# Patient Record
Sex: MALE | Race: BLACK OR AFRICAN AMERICAN | NOT HISPANIC OR LATINO
[De-identification: names, ages, dates, MRNs, and addresses within clinical notes are randomized per-mention and may not be internally consistent; named-entity substitution may affect disease eponyms.]

---

## 2018-12-26 PROBLEM — Z00.00 ENCOUNTER FOR PREVENTIVE HEALTH EXAMINATION: Status: ACTIVE | Noted: 2018-12-26

## 2019-01-22 ENCOUNTER — RECORD ABSTRACTING (OUTPATIENT)
Age: 64
End: 2019-01-22

## 2019-01-22 DIAGNOSIS — Z78.9 OTHER SPECIFIED HEALTH STATUS: ICD-10-CM

## 2019-01-22 DIAGNOSIS — Z83.3 FAMILY HISTORY OF DIABETES MELLITUS: ICD-10-CM

## 2019-01-22 DIAGNOSIS — Z86.39 PERSONAL HISTORY OF OTHER ENDOCRINE, NUTRITIONAL AND METABOLIC DISEASE: ICD-10-CM

## 2019-01-22 RX ORDER — VALSARTAN 320 MG/1
320 TABLET ORAL
Refills: 0 | Status: ACTIVE | COMMUNITY

## 2019-01-22 RX ORDER — AMLODIPINE BESYLATE 5 MG/1
5 TABLET ORAL
Refills: 0 | Status: ACTIVE | COMMUNITY

## 2019-01-22 RX ORDER — CHROMIUM 200 MCG
400 TABLET ORAL
Refills: 0 | Status: ACTIVE | COMMUNITY

## 2019-01-22 RX ORDER — HYDROCORTISONE ACETATE 0.5 %
CREAM (GRAM) TOPICAL
Refills: 0 | Status: ACTIVE | COMMUNITY

## 2019-01-22 RX ORDER — CINNAMON BARK 500 MG
CAPSULE ORAL
Refills: 0 | Status: ACTIVE | COMMUNITY

## 2019-01-25 ENCOUNTER — APPOINTMENT (OUTPATIENT)
Dept: ENDOCRINOLOGY | Facility: CLINIC | Age: 64
End: 2019-01-25
Payer: COMMERCIAL

## 2019-01-25 VITALS
BODY MASS INDEX: 34.22 KG/M2 | DIASTOLIC BLOOD PRESSURE: 80 MMHG | HEART RATE: 84 BPM | SYSTOLIC BLOOD PRESSURE: 120 MMHG | HEIGHT: 70 IN | WEIGHT: 239 LBS

## 2019-01-25 DIAGNOSIS — Z87.828 PERSONAL HISTORY OF OTHER (HEALED) PHYSICAL INJURY AND TRAUMA: ICD-10-CM

## 2019-01-25 PROCEDURE — 99213 OFFICE O/P EST LOW 20 MIN: CPT | Mod: 25

## 2019-01-25 PROCEDURE — 36415 COLL VENOUS BLD VENIPUNCTURE: CPT

## 2019-02-10 LAB
ANION GAP SERPL CALC-SCNC: 11 MMOL/L
BUN SERPL-MCNC: 19 MG/DL
CALCIUM SERPL-MCNC: 9.8 MG/DL
CHLORIDE SERPL-SCNC: 100 MMOL/L
CO2 SERPL-SCNC: 27 MMOL/L
CREAT SERPL-MCNC: 1.45 MG/DL
FRUCTOSAMINE SERPL-MCNC: 257 UMOL/L
GLUCOSE SERPL-MCNC: 117 MG/DL
HBA1C MFR BLD HPLC: 7.1 %
PHOSPHATE SERPL-MCNC: 2.8 MG/DL
POTASSIUM SERPL-SCNC: 5 MMOL/L
SODIUM SERPL-SCNC: 138 MMOL/L

## 2019-04-07 NOTE — HISTORY OF PRESENT ILLNESS
[FreeTextEntry1] : 2019\par \par Brings in original Leonora Compton and given coupon for Leonora 14   \par Being followed at [facility] in [place] Fax [phone] - Dr. Jarrell Frye \par Recent [id number] 1.62 \par 25 OH vit D 37\par B12 344 \par uric acid 7.4\par Mg 1.6\par TSH 4.57 ***  (stable, abs neg)\par \par For diabetes, taking:\par \par    glipizide ER 2.5 mg in PM\par            pioglitazone 15 mg every PM\par            JanuMet  BID\par             Cialis 5 mg prn\par             Amlodipine\par \par He was given good report by his ophthalmologist. \par \par Plan:  . A1c today.  \par \par \par \par Previous notes from [employer] appended below.\par \par  2018\par            .\par            PCP: Dr. Abdifatah Cutler at [facility] [phone]\par             Eyes: Dr. Tobias Pichardo 2015, 2017 p [phone]\par             Card: Dr. Alvarez for EST\par             GI: Dr. Zimmerman - 2014 - +FHx colon cancer age 61\par             Ortho: Dr. Cisneros #2 for knee surgery\par             -> Dr. Howard Red p \par             Nephro: Dr. Lino Ahn - since  for elevated creatinine\par             phone: [phone] [place]\par             fax: [phone]\par             now Dr. Jarrell Frye \par             Feet: Dr. Carrillo in [facility] - Hx bunion surgery bilat\par             Chiropractor: Dr. Wilber Zambrano phone [phone]\par            .\par            CC: Type 2 diabetes () 2016 A1c 7.2 %\par             -"trace retinopathy" will see 2018\par             Possible allergy to insulin\par             Early hypothyroidism - TSH 6.46 2016 -> 2.89 (3/2016; \par             TPO ab neg) \par             (CRI: creatinine 1.48, urine microalb - neg)\par             2017 fall: R rotator cuff and R wrist injury\par            .\par            Reviewed Leonora.\par            Spikes after [place] [employer]. from [employer] in [place]\par            .\par            glipizide ER 2.5 mg in PM\par            Actos 15 mg every PM\par            JanuMet  BID\par             Cialis 5 mg prn\par             Amlodipine\par            .\par            Eyes checked by Dr. Collado in 2018 and again 2019 \par            .\par            ==\par            .\par            2018\par            .\par            PCP: Dr. Abdifatah Cutler at [facility] [phone]\par             Eyes: Dr. Tobias Pichardo 2015, 2017 p [phone]\par             Card: Dr. Alvarez for EST\par             GI: Dr. Zimmerman - 2014 - +FHx colon cancer age 61\par             Ortho: Dr. Cisneros #2 for knee surgery\par             -> Dr. Howard Red p \par             Nephro: Dr. Lino Ahn - since  for elevated creatinine\par             phone: [phone] [place]\par             fax: [phone]\par             now Dr. Jarrell Frye to see 2018\par             Feet: Dr. Carrillo in [facility] -  bunion surgery bilat\par             Chiropractor: Dr. Wilber Zambrano phone [phone]\par            .\par            CC: Type 2 diabetes () 2016 A1c 7.2 %\par             -"trace retinopathy" will see 2018\par             Possible allergy to insulin\par             Early hypothyroidism - TSH 6.46 2016 -> 2.89 (3/2016; \par             TPO ab neg) \par             (CRI: creatinine 1.48, urine microalb - neg)\par             2017 fall: R rotator cuff and R wrist injury\par            ..\par            Using [employer]\par            .\par            Meds:\par            .\par            glipizide ER 2.5 mg in PM\par            Actos 15 mg every PM\par            JanuMet  BID\par             Cialis 5 mg prn\par             Amlodipine\par            .\par            Instructed with wife regarding Leonora.\par            Discussed reactive hypoglycemia after Oatmeal. \par            ROV one month. \par            .\par            ==\par            .\par            2018\par            .\par            PCP: Dr. Abdifatah Cutler at [facility] [phone]\par             Eyes: Dr. Tobias Pichardo 2015, 2017 p [phone]\par             Card: Dr. Alvarez for EST\par             GI: Dr. Zimmerman - 2014 - +FHx colon cancer age 61\par             Ortho: Dr. Cisneros #2 for knee surgery\par             -> Dr. Howard Red p \par             Nephro: Dr. Lino Ahn - since  for elevated creatinine\par             phone: [phone] [place]\par             fax: [phone]\par             now Dr. Jarrell Frye \par             Feet: Dr. Carrillo in  [place] - Hx bunion surgery bilat\par             Chiropractor: Dr. Wilber Zambrano phone [phone]\par            .\par            CC: Type 2 diabetes () 2016 A1c 7.2 %\par             -"trace retinopathy"\par             Possible allergy to insulin\par             Early hypothyroidism - TSH 6.46 2016 -> 2.89 (3/2016; \par             TPO ab neg) \par             (CRI: creatinine 1.48, urine microalb - neg)\par             2017 fall: R rotator cuff and R wrist injury\par            .\par            2017 fell on black ice.\par            Today wearing sling R arm\par            Father 95 yo  past week in [facility].\par            .\par            Freestyle Leonora arrived last week, but he did not bring it in today. \par            .\par            Lab tests from last visit\par            2018 iincluded\par            A1c 6.9 %\par            TSH 2.61\par            creatinine 1.3 \par            microalbumin < 0.3 \par            .\par            Labs from\par            [facility] from\par            5/10/2018 included\par            glucosed 101 mg/dl\par            creatinine 1.55\par            A1c 7.2 % \par            .\par            Taking:\par             glipizide ER 2.5 mg in PM\par             Actos 15 mg every PM\par             JanuMet  BID\par             Cialis 5 mg prn\par             Amlodipin\par            .\par            Brings in records from \par            [facility] [facility] \par            .\par            ==\par            .\par            2018\par            .\par            PCP: Dr. Abdifatah Cutler at [facility] [phone]\par             Eyes: Dr. Tobias Pichardo 2015, 2017 p [phone]\par             Card: Dr. Alvarez for EST\par             GI: Dr. Zimmerman - 2014 - +FHx colon cancer age 61\par             Ortho: Dr. Cisneros #2 for knee surgery\par             -> Dr. Howard Red p \par             Nephro: Dr. Lino Ahn - since  for elevated creatinine\par             phone: [phone] [place]\par             fax: [phone]\par             Feet: Dr. Carrillo in [facility] -  bunion surgery bilat\par             Chiropractor: Dr. Wilber Zambrano phone [phone]\par            .\par            CC: Type 2 diabetes () 2016 A1c 7.2 %\par             Possible allergy to insulin\par             Early hypothyroidism - TSH 6.46 2016 -> 2.89 (3/2016; \par             TPO ab neg) \par             (CRI: creatinine 1.48, urine microalb - neg)\par            .\par            # Has been told of possible spinal stenosis\par            # Recent slip on ice with R hand ? ulna and\par             R shoulder rotator cuff injury and anticipates surgery. \par            # Diabetes\par             Labs from Sept included glucose 88\par             A1c 6. 2\par            . Will see optho tomorrow.\par             Meds: glipizide ER 2.5 mg in PM\par             Actos 15 mg every PM\par             JanuMet  BID\par             Cialis 5 mg prn\par             Amlodipine\par            .\par            Impression: He is doing well; however, a bit down over all of above.\par            .\par            Plan: Same Rx.\par            ROV 3-4 months. \par            .\par            .\par            ==\par            .\par            2017\par            .\par            PCP: Dr. Abdifatah Cutler at [facility] [phone]\par             Eyes: Dr. Tobias Pichardo 2015, 2017 p [phone]\par             Card: Dr. Alvarez for EST\par             GI: Dr. Zimmerman - 2014 - +FHx colon cancer age 61\par             Ortho: Dr. Cisneros #2 for knee surgery\par             -> Dr. Howard Red p \par             Nephro: Dr. Lino Ahn - since  for elevated creatinine\par             phone: [phone] [place]\par             fax: [phone]\par             Feet: Dr. Carrillo in [facility] - Hx bunion surgery bilat\par             Chiropractor: Dr. Wilber Zambrano phone [phone]\par            .\par            CC: Type 2 diabetes () 2016 A1c 7.2 %\par             Possible allergy to insulin\par             Early hypothyroidism - TSH 6.46 2016 -> 2.89 (3/2016; \par             TPO ab neg) \par             (CRI: creatinine 1.48, urine microalb - neg)\par            .\par            Low back has been bothering him and saw his chiropractor, Wilber Zambrano and had x-rays and manipulation. He would like to see pain management and consider accupuncture.\par            .\par            Returns for diabetes. \par            .\par             Last available labs are from \par            2017 and include\par            TSH 4.93\par            creatinine 1.27 \par            .\par            Eye exam in January by Dr. Hubert Lara showed trace retinopathy.\par            .\par            He is currently taking:\par            .\par            glipizide ER 2.5 mg in PM\par            Actos 15 mg every PM\par            JanuMet  BID\par            Cialis 5 mg prn\par            Amlodipine\par            .\par            Impression: Stable borderline elevation of TSH. Will repeat in future. Thyroid antibodies have been negative. He has not yet made it for ultrasound of thyroid.\par            .\par            No fingerstick BS today, but he reports sugars in good range. He will see ophthalmology in January and has been told of early retinopathy. \par            .\par            For decreaed GFR, he sees his kidney doctor regularly. He will see his primary care, Dr. Cutler, in the near future, for annual physical.\par            .\par            Plan: A1c, urine microalbumin today and ROV January. Thank you. -jh\par            .\par            .\par            ==\par            .\par            May 11,2017\par            .\par            PCP: Dr. Abdifatah Cutler at [facility] [phone]\par             Eyes: Dr. Tobias Pichardo 2015, 2017 p [phone]\par             Card: Dr. Alvarez for EST\par             GI: Dr. Zimmerman - 2014 - +FHx colon cancer age 61\par             Ortho: Dr. Cisneros #2 for knee surgery\par             -> Dr. Howard Red p \par             Nephro: Dr. Lino Ahn - since  for elevated creatinine\par             phone: [phone] [place]\par             fax: [phone]\par             Feet: Dr. Carrillo in [facility] - Hx bunion surgery bilat\par            .\par            CC: Type 2 diabetes () 2016 A1c 7.2 %\par             Possible allergy to insulin\par             Early hypothyroidism - TSH 6.46 2016 -> 2.89 (3/2016; \par             TPO ab neg) \par             (CRI: creatinine 1.48, urine microalb - neg)\par            .\par            , R TKR - Dr. Howard Nam at [facility]\par             Re\par            .\par            Meds include:\par            .\par            glipizide ER 2.5 mg in PM\par             Actos 15 mg every PM\par             JanuMet  BID\par             Cialis 5 mg prn\par             Amlodipine\par            .\par            [facility]\par            \par            TSH 4.93\par            creatinine 1.27\par            A1c 6.4 % Dr. Lino Ahn sent to Dr. Gregorio\par            .\par            ==\par            .\par            2017\par            .\par            PCP: Dr. Abdifatah Cutler at [facility] [phone]\par             Eyes: Dr. Tobias Pichardo 2015, 2017 p [phone]\par             Card: Dr. Alvarez for EST\par             GI: Dr. Zimmerman - 2014 - +FHx colon cancer age 61\par             Ortho: Dr. Cisneros #2 for knee surgery\par             -> Dr. Howard Red p \par             Nephro: Dr. Lino Ahn - since  for elevated creatinine\par             phone: [phone] [place]\par             fax: [phone]\par             Feet: Dr. Carrillo in [facility] - Hx bunion surgery bilat\par            .\par            CC: Type 2 diabetes () 2016 A1c 7.2 %\par             Possible allergy to insulin\par             Early hypothyroidism - TSH 6.46 2016 -> 2.89 (3/2016; \par             TPO ab neg) \par             (CRI: creatinine 1.48, urine microalb - neg)\par            .\par            Medications include:\par            .\par            glipizide ER 2.5 mg in PM\par             Actos 15 mg every PM\par             JanuMet  BID\par             Cialis 5 mg prn\par             Amlodipine\par            .\par            Today Mr. Ochoa kindly brought in lab tests from\par            2017 which included\par            PTH 53\par            TSH 3.35\par            HbA1c 7.5 %\par            urine microalbumin ratio 29 (0-30) \par            creatinine 1.4\par            \par            Hct 40 \par            .\par            His eye doctor, Dr. Collado, saw a small yellow spot. \par            .\par            His fingerstick BS show good FBS most of the time \par            .\par            Impression: He attributes A1c rise to stress of preparing taxes at this time of the year.\par            .\par            Plan: Same Rx plus stress reduction. \par            Diabetes under good control for R TKR at [facility]. \par            ROV 4 months. \par            .\par            ==\par            .\par            2016\par            .\par            PCP: Dr. Abdifatah Cutler at [facility] [phone]\par             Eyes: Dr. Tobias Pichardo 2015\par             Card: Dr. Alvarez for EST\par             GI: Dr. Zimmerman - 2014 - +FHx colon cancer age 61\par             Ortho: Dr. Cisneros #2 for knee surgery\par             -> Dr. Howard Red p \par             Nephro: Dr. Lino Ahn - since  for elevated creatinine\par             phone:  [place]\par             fax: [phone]\par             Feet: Dr. Carrillo in [facility] bunion surgery bilat\par            .\par            CC: DM2 () 2016 A1c 7.2 %\par             Possible allergy to insulin\par             Early hypothyroidism - TSH 6.46 2016 -> 2.89 (3/2016; \par             TPO ab neg) \par             (CRI: creatinine 1.48, urine microalb - neg)\par            .\par            Told recent A1c 6.6 %\par            .\par            Will see Dr. Cutler in October \par            .\par            glipizide ER 2.5 mg in PM\par             Actos 15 mg every PM\par             JanuMet  BID\par             Cialis 5 mg prn\par             Amlodipine\par            .\par            Dr. Patel did blood end of August. \par            .\par            Impression: Decreased GFR - followed by Dr. Ahn.\par            Plan: Decrease the JanuMet  to once a day at next visit in January.\par            .\par            .\par            .\par            ==\par            .\par            Conchita 15, 2016\par            .\par            PCP: Dr. Abdifatah Cutler at [facility] [phone]\par             Eyes: Dr. Tobias Pichardo 2015\par             Card: Dr. Alvarez for EST\par             GI: Dr. Cisneros - 2014 - +FHx colon cancer age 61\par             Ortho: Dr. Cisneros #2 for knee surgery\par             -> Dr. Howard Red p \par             Nephro: Dr. Lino Ahn - since  for elevated creatinine\par             phone:  [place]\par             fax: [phone]\par             Feet: Dr. Carrillo in [facility] bunion surgery bilat\par            .\par            CC: DM2 () 2016 A1c 7.2 %\par             Possible allergy to insulin\par             Early hypothyroidism - TSH 6.46 2016 -> 2.89 (3/2016; \par             TPO ab neg) \par             (CRI: creatinine 1.48, urine microalb - neg)\par            .\par            Required injection of steroids into R knee with some benefit.\par            Has had 5 knee surgeries in the past. \par            .\par            Diabetes medications remain:\par            .\par             glipizide ER 2.5 mg in PM\par             Actos 15 mg every PM\par             JanuMet  BID\par             Cialis 5 mg prn\par             Amlodipine\par            .\par            Self tests with [employer] and brings fingerstick BS.\par            FBS all normal. After dinner, as high as 205, 175 , 172, 279, 263, 207, 184 mg/dl.\par            .\par            Gets strips from [facility] in [place] on [address].\par            .\par            Impression: Walking less b/o R knee pain.\par            Had recent URI.\par            .\par            Plan: Same Rx. Review Meloxicam with Renal.\par            ROV 3 months. \par            .\par            .\par            .\par            ==\par            .\par            2016\par            .\par            PCP: Dr. Abdifatah Cutler at [facility] [phone]\par             Eyes: Dr. Tobias Pichardo 2015\par             Card: Dr. Alvarez for EST\par             GI: Dr. Cisneros - 2014 - +FHx colon cancer age 61\par             Ortho: Dr. Cisneros #2 for knee surgery\par             Nephro: Dr. Lino Ahn - since  for elevated creatinine\par             phone: [phone] [place]\par             fax: [phone]\par             Feet: Dr. Carrillo in [facility] - Hx bunion surgery bilat\par            .\par            CC: DM2 ()\par             Possible allergy to insulin\par            .\par            Labs ordered by Dr. Lino Ahn at [facility] on 2016 are kindly provided by Mr. Ochoa and include:\par            .\par            25 OH vit D 29\par             mg/dl (109 in Nov)\par            creatinine 1.48 *** (1.43 in Nov)\par            uric acid 5.9\par            T4 7.1\par            TSH 6.46 *** (2.82 in Nov) \par            HbA1c 7.2 % (7.1 % in Nov)\par            PTH 71 (11-67)\par            .\par            Medications include:\par            .\par             glipizide ER 2.5 mg in PM\par             Actos 15 mg every PM\par             JanuMet  BID\par             Cialis 5 mg prn\par             Amlodipine 5 mg daily and that\par            Testing with [employer] meter. \par            He notes that "CranRaisins" in hiss Tossed tuna salad/cheese cause BS to go from  mg/dl \par            But Oxtail and Brown Rice/salad caused BS 85 to go to 198\par            .\par            Impression: \par            # Pre-hypothyroid Plan: repeat TSH, abs, ultrasound\par            # Diabetes under very good control. Probable insulin allergy.\par             May need to stop the glipizide.\par            ROV 3 1/2 months. \par            .\par            ==\par            .\par            Dec 9, 2015\par            .\par            PCP: [facility] [phone]\par             Eyes: Dr. Tobias Pichardo 2015\par             Card: Dr. Alvarez for EST\par             GI: Dr. Cisneros - 2014 - +FHx colon cancer age 61\par             Ortho: Dr. Cisneros #2 for knee surgery\par             Nephro: Dr. Lino Ahn - since  for elevated creatinine\par             phone: [phone] [place]\par             fax: [phone]\par             Feet: Dr. Carrillo in [facility] - Hx bunion surgery bilat\par            .\par            CC: DM2 ()\par             Possible allergy to insulin\par            .\par            Note from August appended below.\par            Has URI with cough. => elev sugar\par            Mr. Ochoa kindly brings in report from Dr. Lino Ahn at [facility] which includes lab studies:\par             mg/dl\par            BUN 15\par            creatinine 1.43 **\par             ()\par            HbA1c 7.1% \par            TSH 2.82\par            .\par            Medications inlcude:\par            .\par            glipizide ER 2.5 mg in PM\par            Actos 15 mg every PM\par            Metformin 1000 mg BID -> JanuMet  BID\par            Cialis 5 mg daily\par            Amlodipine 5 mg daily and that\par            .\par            Impression: Doing well. He enjoys a fair amount of starch.\par            .\par            Plan: Test AC and PC\par            ROV 4 months. -jh\par            .\par            .\par            .\par            ==\par            .\par            2015\par            .\par            PCP: Dr. Abdifatah Cutler at [facility] [phone]\par             Eyes: Dr. Tobias Pichardo 2015\par             Card: Dr. Alvarez for EST\par             GI: Dr. Cisneros - 2014 - +FHx colon cancer age 61\par             Ortho: Dr. Cisneros #2 for knee surgery\par             Nephro: Dr. 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 Lino Ahn. Dr. Ahn reports patient is newly taking \par            Valsartan 320 mg daily and remains on\par            Vitamin D 1000 units daily\par            Atorvastatin 10 mg daily\par            Actos 15 mg every PM\par            Metformin 1000 mg BID -> JanuMet  BID\par            Cialis 5 mg daily\par            Amlodipine 5 mg daily and that\par            Diovan was discontinued. \par            .\par            He also takes glipizide ER 2.5 mg in PM\par            .\par            Also included are blood tests which show:\par            glucose 104 mg/dl\par            BUN 13\par            creatinine 1.4\par            potassium 4.7\par            T4 6.5\par            TSH 3.54\par            PTH 75.5 (<67)\par            HbA1c 6.4% *********** (down from 7.7% in 2013)\par            .\par            Impression: Left his fingerstick BS at home. But he reports that FBS around  and when he checks 2 hours after meals BS usually up to 140, but as high as 175 mg/dl if has pancakes. In sum, doing very well.\par            .\par            Plan: Same diabetic Rx. Bring fingerstick BS to next visit.\par            .\par            ==\par            .\par            May 12, 2015\par            .\par            PCP: Dr. Abdifatah [facility] at [facility] [phone]\par             Eyes: Dr. Tobias Pichardo 2015\par             Card: Dr. Alvarez for EST\par             GI: Dr. Cisneros - 2014 - +FHx colon cancer age 61\par             Ortho: Dr. Cisneros #2 for knee surgery\par             Nephro: Dr. Lino Ahn - since  for elevated creatinine\par             phone: [phone] [place]\par             fax: [phone]\par             .\par            . \par            CC: DM2 ()\par             Possible allergy to insulin\par            .\par            Labs kindly provided by Dr. Ahn show\par             m/dl\par            creatinine 1.42\par            TSH 3.41\par            HgbA1c 7.1%\par            . \par            Diabetes meds:\par            .\par            glipiizide ER 2.5 mg in PM\par            metformin 1000 mg BID\par            Actos 15 mg in AM\par            .\par            Impression. Doing well. \par            May have 10:30 am reactive hypoglycemia (vs glipizide effect).\par            Plan: add Januvia 50 mg in AM as JanuMet.\par            .\par            ROV 4 months.\par            .\par            =======\par            Nov 10, 2014\par            PCP: Dr. Jefry Albright (elev PSA)\par             Eyes: Dr. Tobias Pichardo\par             Card: Dr. Alvarez for EST\par             GI: Dr. Cisneros - 2014 - +FHx colon cancer age 61\par             Nephro: Dr. Ahn - since  for elevated creatinine\par            . \par            CC: DM2 ()\par             Possible allergy to insulin\par            .\par            Current medicatilns for diabetes:\par            .\par            glipiizide ER 2.5 mg in PM\par            metformin 1000 mg BID\par            Actos 15 mg in AM\par            .\par            Denies any recent hypoglycemia. Lowest ever was 79 mg/dl and felt OK. \par            .\par            .\par            Labs from Dr. Lino Ahn  at [facility] in [place] (pone ) include\par            HbA1c 6.5% ***\par            Hct 45.6\par            HDL 47\par            LDL 51\par            BS 90\par            creatinine 1.38\par            BUN 16\par            potassium 4.9 \par            25 OH vit D 29 \par            .\par            Impression: Doing very well. Dr. Quintanilla has been monitoring his blood pressure and encouring walking. [facility] \par            .\par            Plan: Same Rx and ROV six months. 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 [place] [facility] \par            HbA1c 6.5%\par            .\par            Today he brings computer printout of sugars and diet diary.\par            R knee injection for pain - he noted sugars went up then. \par            .\par            Also received steroid spray for coughing when exposed to cold. \par            Impression: Keeps excellent records.\par            Sugars AC are all fine.\par            PC generally good, even with Glucerna. \par            Plan: Nothing to add or change at the moment as he is doing so well.\par            U/A need to be monitored b/o Actos at present. \par            .\par            ======\par            .\par            2013\par            PCP: Dr. Jefry Albright Retina: Dr. Iker Pichardo\par            CC: DM2 (), ? insulin allergy\par            .\par            Off Bydurion.\par            On metformin 1000 mg BID\par            Actos 15 mg every AM\par            .\par            Feels Actos has helped.\par            .\par            =====\par            2013\par            PCP: Dr. Jefry Albright\par            CC: DM2 ? insulin allergy\par            .\par            Remains on \par            Bydurian weekly\par            metformin 1000 mg BID\par            .\par            Brought in his records.\par            All before meal BS excellent.\par            After meals may go into 180's max.\par            Even though still has bagels or oatmeal, post meal BS somewhat better, fructosamine WNL.\par            Dr. Albright did A1c recently, not yet available. \par            .\par            He said that [facility] Nurse originally advised the Bydurion (!) but he is thinking of trying something else. Took Actos in the past, but stopped because of fears.\par            Plan: Actos 15 mg every AM; continue the metformin 1000 mg BID.\par            He is welcome to stay on the Bydurion if he likes and is well aware of pancreatitis and pancreatic cancer issues.\par            .\par            Dr. Collado in [place] is his eye doctor. Given good report. \par            Podiatrist - Dr. Carrillo - saw himn last week. \par            GI: Colonoscopy at the end of the end of the month. \par            May 14, 2013\par            PCP: Dr. Jefry Albright\par            CC: DM2 ? insulin allergy\par            .\par            Last note appended below.\par            Remains on metformin 1000 mg BID and\par            Bydurion weekly on Sundays.\par            .\par            Although he brought careful records, he tells me that the FBS is around 110 and up to 271 after instant oatmeal. \par            .\par            He has modified his diet based on that. \par            +++\par            April 15, 2013\par            CC: DM2\par            .\par            First visit for 56 yo who works at [employer] in [employer], accompanied by his wife, for DM2.\par            Presented  with "polys, weight loss, ketosis" and hospitalized at [facility] for a week. Recalls treatment with insulin and eventually Actos. ?Insulin allergy.\par            .\par            Currently on metformin 1000 mg BID and colin been on\par            Bydurion for about 3 months.\par            .\par            Blood tests from  show  mg/dl, A1c 7.7%. \par            microalbumin 61 \par            .\par            Impression: Tests fingerstick BS about 14 X a week w/o food diary.\par            Wants to know what medication will make A1c better.\par

## 2019-06-21 ENCOUNTER — APPOINTMENT (OUTPATIENT)
Dept: ENDOCRINOLOGY | Facility: CLINIC | Age: 64
End: 2019-06-21
Payer: COMMERCIAL

## 2019-06-21 VITALS
SYSTOLIC BLOOD PRESSURE: 115 MMHG | HEIGHT: 70 IN | DIASTOLIC BLOOD PRESSURE: 68 MMHG | BODY MASS INDEX: 35.07 KG/M2 | HEART RATE: 80 BPM | WEIGHT: 245 LBS

## 2019-06-21 DIAGNOSIS — E78.5 HYPERLIPIDEMIA, UNSPECIFIED: ICD-10-CM

## 2019-06-21 DIAGNOSIS — D64.9 ANEMIA, UNSPECIFIED: ICD-10-CM

## 2019-06-21 DIAGNOSIS — E53.8 DEFICIENCY OF OTHER SPECIFIED B GROUP VITAMINS: ICD-10-CM

## 2019-06-21 PROCEDURE — 36415 COLL VENOUS BLD VENIPUNCTURE: CPT

## 2019-06-21 PROCEDURE — 99213 OFFICE O/P EST LOW 20 MIN: CPT | Mod: 25

## 2019-06-21 NOTE — HISTORY OF PRESENT ILLNESS
[FreeTextEntry1] : 2019\par \par  PCP: Dr. Abdifatah Cutler at [facility] [phone]\par             Eyes: Dr. Tobias Pichardo 2015, 2017 p [phone]\par             Card: Dr. Alvarez for EST (retired)\par             GI: Dr. Zimmerman - 2014 - +FHx colon cancer age 61\par             Ortho: Dr. Cisneros #2 for knee surgery\par             -> Dr. Howard Red p \par             Nephro: Dr. Lino Ahn - since  for elevated creatinine - retired\par             phone: [phone] [place]  fax: [phone]  now Dr. Jarrell Frye \par             Feet: Dr. Carrillo in [facility] - Hx bunion surgery bilateral\par             Chiropractor: Dr. Wilber Zambrano phone [phone]\par            .\par            CC: Type 2 diabetes () 2016 A1c 7.2 %\par             -"trace retinopathy" - Dr. Lara in [place]  last 2019\par             Possible allergy to insulin\par             Early hypothyroidism - TSH 6.46 2016 -> 2.89 (3/2016; \par             TPO ab neg) \par             (CRI: creatinine 1.48, urine microalbumin - negative)\par             2017 fall: R rotator cuff and R wrist injury\par            .\par Last here in 2019 and switched from Leonora "10" to Leonora 14.\par Labs from January included creatinine 1.45, glucose 145 and A1c 7.1 %\par 2018 TSH 4.57 (on no Rx)\par \par Medications include:\par  glipizide ER 2.5 mg in PM           *****\par   pioglitazone 15 mg every PM\par  JanuMet  BID                *****\par   Cialis 5 mg prn\par   Amlodipine\par \par \par \par Brings in original Leonora [place] and given coupon for Leonora 14   \par Being followed at [facility] in [place] Fax [phone] - Dr. Jarrell Frye \par Recent bonadqdkg1o 1.62 \par 25 OH vit D 37\par B12 344 \par uric acid 7.4\par Mg 1.6\par TSH 4.57 ***  (stable, abs neg)\par \par For diabetes, taking:\par \par    glipizide ER 2.5 mg in PM\par            pioglitazone 15 mg every PM\par            JanuMet  BID\par             Cialis 5 mg prn\par             Amlodipine\par \par He was given good report by his ophthalmologist. \par \par Plan:  . A1c today.  \par \par \par \par Previous notes from [employer] appended below.\par \par  2018\par            .\par            PCP: Dr. Abdifatah Cutler at [facility] [phone]\par             Eyes: Dr. Tobias Pichardo 2015, 2017 p [phone]\par             Card: Dr. Alvarez for EST\par             GI: Dr. Zimmerman - 2014 - +FHx colon cancer age 61\par             Ortho: Dr. Cisneros #2 for knee surgery\par             -> Dr. Howard Red p \par             Nephro: Dr. Lino Ahn - since  for elevated creatinine\par             phone: [phone] [place]\par             fax: [phone]\par             now Dr. Jarrell Frye \par             Feet: Dr. Carrillo in [facility] -  bunion surgery bilat\par             Chiropractor: Dr. Wilber Zambrano phone [phone]\par            .\par            CC: Type 2 diabetes () 2016 A1c 7.2 %\par             -"trace retinopathy" will see 2018\par             Possible allergy to insulin\par             Early hypothyroidism - TSH 6.46 2016 -> 2.89 (3/2016; \par             TPO ab neg) \par             (CRI: creatinine 1.48, urine microalb - neg)\par             2017 fall: R rotator cuff and R wrist injury\par            .\par            Reviewed Leonora.\par            Spikes after [place] [employer]. from [employer] in [place]\par            .\par            glipizide ER 2.5 mg in PM\par            Actos 15 mg every PM\par            JanuMet  BID\par             Cialis 5 mg prn\par             Amlodipine\par            .\par            Eyes checked by Dr. Collado in 2018 and again 2019 \par            .\par            ==\par            .\par            2018\par            .\par            PCP: Dr. Abdifatah Cutler at [facility] [phone]\par             Eyes: Dr. Tobias Pichardo 2015, 2017 p [phone]\par             Card: Dr. Alvarez for EST\par             GI: Dr. Zimmerman - 2014 - +FHx colon cancer age 61\par             Ortho: Dr. Cisneros #2 for knee surgery\par             -> Dr. Howard Red p \par             Nephro: Dr. Lino Anh - since  for elevated creatinine\par             phone: [phone] [place]\par             fax: [phone]\par             now Dr. Jarrell Frye to see 2018\par             Feet: Dr. Carrillo in [facility] -  bunion surgery bilat\par             Chiropractor: Dr. Wilber Zambrano phone [phone]\par            .\par            CC: Type 2 diabetes () 2016 A1c 7.2 %\par             -"trace retinopathy" will see 2018\par             Possible allergy to insulin\par             Early hypothyroidism - TSH 6.46 2016 -> 2.89 (3/2016; \par             TPO ab neg) \par             (CRI: creatinine 1.48, urine microalb - neg)\par             2017 fall: R rotator cuff and R wrist injury\par            ..\par            Using [employer]\par            .\par            Meds:\par            .\par            glipizide ER 2.5 mg in PM\par            Actos 15 mg every PM\par            JanuMet  BID\par             Cialis 5 mg prn\par             Amlodipine\par            .\par            Instructed with wife regarding Leonora.\par            Discussed reactive hypoglycemia after Oatmeal. \par            ROV one month. \par            .\par            ==\par            .\par            2018\par            .\par            PCP: Dr. Abdifatah Cutler at [facility] [phone]\par             Eyes: Dr. Tobias Pichardo 2015, 2017 p [phone]\par             Card: Dr. Alvarez for EST\par             GI: Dr. Zimmerman - 2014 - +FHx colon cancer age 61\par             Ortho: Dr. Cisneros #2 for knee surgery\par             -> Dr. Howard Red p \par             Nephro: Dr. Lino Ahn - since  for elevated creatinine\par             phone: [phone] [place]\par             fax: [phone]\par             now Dr. Jarrell Frye \par             Feet: Dr. Carrillo in [facility] -  bunion surgery bilat\par             Chiropractor: Dr. Wilber Zambrano phone [phone]\par            .\par            CC: Type 2 diabetes () 2016 A1c 7.2 %\par             -"trace retinopathy"\par             Possible allergy to insulin\par             Early hypothyroidism - TSH 6.46 2016 -> 2.89 (3/2016; \par             TPO ab neg) \par             (CRI: creatinine 1.48, urine microalb - neg)\par             2017 fall: R rotator cuff and R wrist injury\par            .\par            2017 fell on black ice.\par            Today wearing sling R arm\par            Father 95 yo  past week in [facility].\par            .\par            Freestyle Leonora arrived last week, but he did not bring it in today. \par            .\par            Lab tests from last visit\par            2018 iincluded\par            A1c 6.9 %\par            TSH 2.61\par            creatinine 1.3 \par            microalbumin < 0.3 \par            .\par            Labs from\par            [facility] from\par            5/10/2018 included\par            glucosed 101 mg/dl\par            creatinine 1.55\par            A1c 7.2 % \par            .\par            Taking:\par             glipizide ER 2.5 mg in PM\par             Actos 15 mg every PM\par             JanuMet  BID\par             Cialis 5 mg prn\par             Amlodipin\par            .\par            Brings in records from \par            [facility] [facility] \par            .\par            ==\par            .\par            2018\par            .\par            PCP: Dr. Abdifatah Cutler at [facility] [phone]\par             Eyes: Dr. Tobias Pichardo 2015, 2017 p [phone]\par             Card: Dr. Alvarez for EST\par             GI: Dr. Zimmerman - 2014 - +FHx colon cancer age 61\par             Ortho: Dr. Cisneros #2 for knee surgery\par             -> Dr. Howard Red p \par             Nephro: Dr. Lino Ahn - since  for elevated creatinine\par             phone: [phone] [place]\par             fax: [phone]\par             Feet: Dr. Carrillo in [facility] -  bunion surgery bilat\par             Chiropractor: Dr. Wilber Zambrano phone [phone]\par            .\par            CC: Type 2 diabetes () 2016 A1c 7.2 %\par             Possible allergy to insulin\par             Early hypothyroidism - TSH 6.46 2016 -> 2.89 (3/2016; \par             TPO ab neg) \par             (CRI: creatinine 1.48, urine microalb - neg)\par            .\par            # Has been told of possible spinal stenosis\par            # Recent slip on ice with R hand ? ulna and\par             R shoulder rotator cuff injury and anticipates surgery. \par            # Diabetes\par             Labs from Sept included glucose 88\par             A1c 6. 2\par            . Will see optho tomorrow.\par             Meds: glipizide ER 2.5 mg in PM\par             Actos 15 mg every PM\par             JanuMet  BID\par             Cialis 5 mg prn\par             Amlodipine\par            .\par            Impression: He is doing well; however, a bit down over all of above.\par            .\par            Plan: Same Rx.\par            ROV 3-4 months. \par            .\par            .\par            ==\par            .\par            2017\par            .\par            PCP: Dr. Abdifatah Cutler at [facility] [phone]\par             Eyes: Dr. Tobias Pichardo 2015, 2017 p [phone]\par             Card: Dr. Alvarez for EST\par             GI: Dr. Zimmerman - 2014 - +FHx colon cancer age 61\par             Ortho: Dr. Cisneros #2 for knee surgery\par             -> Dr. Howard Red p \par             Nephro: Dr. Lino Ahn - since  for elevated creatinine\par             phone: [phone] [place]\par             fax: [phone]\par             Feet: Dr. Carrillo in [facility] - Hx bunion surgery bilat\par             Chiropractor: Dr. Wilber Zambrano phone [phone]\par            .\par            CC: Type 2 diabetes () 2016 A1c 7.2 %\par             Possible allergy to insulin\par             Early hypothyroidism - TSH 6.46 2016 -> 2.89 (3/2016; \par             TPO ab neg) \par             (CRI: creatinine 1.48, urine microalb - neg)\par            .\par            Low back has been bothering him and saw his chiropractor, Wilber Zambrano and had x-rays and manipulation. He would like to see pain management and consider accupuncture.\par            .\par            Returns for diabetes. \par            .\par             Last available labs are from \par            2017 and include\par            TSH 4.93\par            creatinine 1.27 \par            .\par            Eye exam in January by Dr. Hubert Lara showed trace retinopathy.\par            .\par            He is currently taking:\par            .\par            glipizide ER 2.5 mg in PM\par            Actos 15 mg every PM\par            JanuMet  BID\par            Cialis 5 mg prn\par            Amlodipine\par            .\par            Impression: Stable borderline elevation of TSH. Will repeat in future. Thyroid antibodies have been negative. He has not yet made it for ultrasound of thyroid.\par            .\par            No fingerstick BS today, but he reports sugars in good range. He will see ophthalmology in January and has been told of early retinopathy. \par            .\par            For decreaed GFR, he sees his kidney doctor regularly. He will see his primary care, Dr. Cutler, in the near future, for annual physical.\par            .\par            Plan: A1c, urine microalbumin today and ROV January. Thank you. -\par            .\par            .\par            ==\par            .\par            May 11,2017\par            .\par            PCP: Dr. Abdifatah Cutler at [facility] [phone]\par             Eyes: Dr. Tobias Pichardo 2015, 2017 p [phone]\par             Card: Dr. Alvarez for EST\par             GI: Dr. Zimmerman - 2014 - +FHx colon cancer age 61\par             Ortho: Dr. Cisneros #2 for knee surgery\par             -> Dr. Howard Red p \par             Nephro: Dr. Lino Ahn - since  for elevated creatinine\par             phone: [phone] [place]\par             fax: [phone]\par             Feet: Dr. Carrillo in [facility] - Hx bunion surgery bilat\par            .\par            CC: Type 2 diabetes () 2016 A1c 7.2 %\par             Possible allergy to insulin\par             Early hypothyroidism - TSH 6.46 2016 -> 2.89 (3/2016; \par             TPO ab neg) \par             (CRI: creatinine 1.48, urine microalb - neg)\par            .\par            , R TKR - Dr. Howard Nam at [facility]\par             Re\par            .\par            Meds include:\par            .\par            glipizide ER 2.5 mg in PM\par             Actos 15 mg every PM\par             JanuMet  BID\par             Cialis 5 mg prn\par             Amlodipine\par            .\par            [facility]\par            \par            TSH 4.93\par            creatinine 1.27\par            A1c 6.4 % Dr. Lino Ahn sent to Dr. Gregorio\par            .\par            ==\par            .\par            2017\par            .\par            PCP: Dr. Abdifatah Cutler at [facility] [phone]\par             Eyes: Dr. Tobias Pichardo 2015, 2017 p [phone]\par             Card: Dr. Alvarez for EST\par             GI: Dr. Zimmerman - 2014 - +FHx colon cancer age 61\par             Ortho: Dr. Cisneros #2 for knee surgery\par             -> Dr. Howard Red p \par             Nephro: Dr. Lino Ahn - since  for elevated creatinine\par             phone:  [place]\par             fax: [phone]\par             Feet: Dr. Carrillo in [facility] bunion surgery bilat\par            .\par            CC: Type 2 diabetes () 2016 A1c 7.2 %\par             Possible allergy to insulin\par             Early hypothyroidism - TSH 6.46 2016 -> 2.89 (3/2016; \par             TPO ab neg) \par             (CRI: creatinine 1.48, urine microalb - neg)\par            .\par            Medications include:\par            .\par            glipizide ER 2.5 mg in PM\par             Actos 15 mg every PM\par             JanuMet  BID\par             Cialis 5 mg prn\par             Amlodipine\par            .\par            Today Mr. Ochoa kindly brought in lab tests from\par            2017 which included\par            PTH 53\par            TSH 3.35\par            HbA1c 7.5 %\par            urine microalbumin ratio 29 (0-30) \par            creatinine 1.4\par            \par            Hct 40 \par            .\par            His eye doctor, Dr. Collado, saw a small yellow spot. \par            .\par            His fingerstick BS show good FBS most of the time \par            .\par            Impression: He attributes A1c rise to stress of preparing taxes at this time of the year.\par            .\par            Plan: Same Rx plus stress reduction. \par            Diabetes under good control for R TKR at [facility]. \par            ROV 4 months. \par            .\par            ==\par            .\par            2016\par            .\par            PCP: Dr. Abdifatah Cutler at [facility] [phone]\par             Eyes: Dr. Tobias Pichardo 2015\par             Card: Dr. Alvarez for EST\par             GI: Dr. Zimmerman - 2014 - +FHx colon cancer age 61\par             Ortho: Dr. Cisneros #2 for knee surgery\par             -> Dr. Howard Red p \par             Nephro: Dr. Lino Ahn - since  for elevated creatinine\par             phone:  [place]\par             fax: [phone]\par             Feet: Dr. Carrillo in [facility] bunion surgery bilat\par            .\par            CC: DM2 () 2016 A1c 7.2 %\par             Possible allergy to insulin\par             Early hypothyroidism - TSH 6.46 2016 -> 2.89 (3/2016; \par             TPO ab neg) \par             (CRI: creatinine 1.48, urine microalb - neg)\par            .\par            Told recent A1c 6.6 %\par            .\par            Will see Dr. Cutler in October \par            .\par            glipizide ER 2.5 mg in PM\par             Actos 15 mg every PM\par             JanuMet  BID\par             Cialis 5 mg prn\par             Amlodipine\par            .\par            Dr. Patel did blood end of August. \par            .\par            Impression: Decreased GFR - followed by Dr. Ahn.\par            Plan: Decrease the JanuMet  to once a day at next visit in January.\par            .\par            .\par            .\par            ==\par            .\par            Conchita 15, 2016\par            .\par            PCP: Dr. Abdifatah Cutler at [facility] [phone]\par             Eyes: Dr. Tobias Pichardo 2015\par             Card: Dr. Alvarez for EST\par             GI: Dr. Cisneros - 2014 - +FHx colon cancer age 61\par             Ortho: Dr. Cisneros #2 for knee surgery\par             -> Dr. Howard Red p \par             Nephro: Dr. Lino Ahn - since  for elevated creatinine\par             phone: [phone] [place]\par             fax: [phone]\par             Feet: Dr. Carrillo in [facility] bunion surgery bilat\par            .\par            CC: DM2 () 2016 A1c 7.2 %\par             Possible allergy to insulin\par             Early hypothyroidism - TSH 6.46 2016 -> 2.89 (3/2016; \par             TPO ab neg) \par             (CRI: creatinine 1.48, urine microalb - neg)\par            .\par            Required injection of steroids into R knee with some benefit.\par            Has had 5 knee surgeries in the past. \par            .\par            Diabetes medications remain:\par            .\par             glipizide ER 2.5 mg in PM\par             Actos 15 mg every PM\par             JanuMet  BID\par             Cialis 5 mg prn\par             Amlodipine\par            .\par            Self tests with [employer] and brings fingerstick BS.\par            FBS all normal. After dinner, as high as 205, 175 , 172, 279, 263, 207, 184 mg/dl.\par            .\par            Gets strips from [facility] in [place] on [address]par            .\par            Impression: Walking less b/o R knee pain.\par            Had recent URI.\par            .\par            Plan: Same Rx. Review Meloxicam with Renal.\par            ROV 3 months. \par            .\par            .\par            .\par            ==\par            .\par            2016\par            .\par            PCP: Dr. Abdifatah Cutler at [facility] [phone]\par             Eyes: Dr. Tobias Pichardo 2015\par             Card: Dr. Alvarez for EST\par             GI: Dr. Cisneros - 2014 - +FHx colon cancer age 61\par             Ortho: Dr. Cisneros #2 for knee surgery\par             Nephro: Dr. Lino Ahn - since  for elevated creatinine\par             phone: [phone] [place]\par             fax: [phone]\par             Feet: Dr. Carrillo in [facility] - Hx bunion surgery bilat\par            .\par            CC: DM2 ()\par             Possible allergy to insulin\par            .\par            Labs ordered by Dr. Lino Ahn at [facility] on 2016 are kindly provided by  Don and include:\par            .\par            25 OH vit D 29\par             mg/dl (109 in Nov)\par            creatinine 1.48 *** (1.43 in Nov)\par            uric acid 5.9\par            T4 7.1\par            TSH 6.46 *** (2.82 in Nov) \par            HbA1c 7.2 % (7.1 % in Nov)\par            PTH 71 (11-67)\par            .\par            Medications include:\par            .\par             glipizide ER 2.5 mg in PM\par             Actos 15 mg every PM\par             JanuMet  BID\par             Cialis 5 mg prn\par             Amlodipine 5 mg daily and that\par            Testing with [employer] meter. \par            He notes that "CranRaisins" in hiss Tossed tuna salad/cheese cause BS to go from  mg/dl \par            But Oxtail and Brown Rice/salad caused BS 85 to go to 198\par            .\par            Impression: \par            # Pre-hypothyroid Plan: repeat TSH, abs, ultrasound\par            # Diabetes under very good control. Probable insulin allergy.\par             May need to stop the glipizide.\par            ROV 3 1/2 months. \par            .\par            ==\par            .\par            Dec 9, 2015\par            .\par            PCP: Dr. Abdifatah Cutler at [facility] [phone]\par             Eyes: Dr. Tobias Pichardo 2015\par             Card: Dr. Alvarze for EST\par             GI: Dr. Cisneros - 2014 - +FHx colon cancer age 61\par             Ortho: Dr. Cisneros #2 for knee surgery\par             Nephro: Dr. Lino Ahn - since  for elevated creatinine\par             phone: [phone] [place]\par             fax: [phone]\par             Feet: Dr. Carrillo in [facility] - Hx bunion surgery bilat\par            .\par            CC: DM2 ()\par             Possible allergy to insulin\par            .\par            Note from August appended below.\par            Has URI with cough. => elev sugar\par            Mr. Ochoa kindly brings in report from Dr. Lino Ahn at [facility] which includes lab studies:\par             mg/dl\par            BUN 15\par            creatinine 1.43 **\par             ()\par            HbA1c 7.1% \par            TSH 2.82\par            .\par            Medications inlcude:\par            .\par            glipizide ER 2.5 mg in PM\par            Actos 15 mg every PM\par            Metformin 1000 mg BID -> JanuMet  BID\par            Cialis 5 mg daily\par            Amlodipine 5 mg daily and that\par            .\par            Impression: Doing well. He enjoys a fair amount of starch.\par            .\par            Plan: Test AC and PC\par            ROV 4 months. -jh\par            .\par            .\par            .\par            ==\par            .\par            2015\par            .\par            PCP: Dr. Abdifatah Cutler at [facility] [phone]\par             Eyes: Dr. Tobias Pichardo 2015\par             Card: Dr. Alvarez for EST\par             GI: Dr. Cisneros - 2014 - +FHx colon cancer age 61\par             Ortho: Dr. Cisneros #2 for knee surgery\par             Nephro: Dr. 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 Lino Ahn. Dr. Ahn reports patient is newly taking \par            Valsartan 320 mg daily and remains on\par            Vitamin D 1000 units daily\par            Atorvastatin 10 mg daily\par            Actos 15 mg every PM\par            Metformin 1000 mg BID -> JanuMet  BID\par            Cialis 5 mg daily\par            Amlodipine 5 mg daily and that\par            Diovan was discontinued. \par            .\par            He also takes glipizide ER 2.5 mg in PM\par            .\par            Also included are blood tests which show:\par            glucose 104 mg/dl\par            BUN 13\par            creatinine 1.4\par            potassium 4.7\par            T4 6.5\par            TSH 3.54\par            PTH 75.5 (<67)\par            HbA1c 6.4% *********** (down from 7.7% in 2013)\par            .\par            Impression: Left his fingerstick BS at home. But he reports that FBS around  and when he checks 2 hours after meals BS usually up to 140, but as high as 175 mg/dl if has pancakes. In sum, doing very well.\par            .\par            Plan: Same diabetic Rx. Bring fingerstick BS to next visit.\par            .\par            ==\par            .\par            May 12, 2015\par            .\par            PCP: Dr. Abdifatah Cutler at [facility] [phone]\par             Eyes: Dr. Tobias Pichardo 2015\par             Card: Dr. Alvarez for EST\par             GI: Dr. Cisneros - 2014 - +FHx colon cancer age 61\par             Ortho: Dr. Cisneros #2 for knee surgery\par             Nephro: Dr. Lino Ahn - since  for elevated creatinine\par             phone: [phone] [place]\par             fax: [phone]\par             .\par            . \par            CC: DM2 ()\par             Possible allergy to insulin\par            .\par            Labs kindly provided by Dr. Ahn show\par             m/dl\par            creatinine 1.42\par            TSH 3.41\par            HgbA1c 7.1%\par            . \par            Diabetes meds:\par            .\par            glipiizide ER 2.5 mg in PM\par            metformin 1000 mg BID\par            Actos 15 mg in AM\par            .\par            Impression. Doing well. \par            May have 10:30 am reactive hypoglycemia (vs glipizide effect).\par            Plan: add Januvia 50 mg in AM as JanuMet.\par            .\par            ROV 4 months.\par            .\par            =======\par            Nov 10, 2014\par            PCP: Dr. Jefry Albright (elev PSA)\par             Eyes: Dr. Tobias Pichardo\par             Card: Dr. Alvarez for EST\par             GI: Dr. Cisneros - 2014 - +FHx colon cancer age 61\par             Nephro: Dr. Ahn - since  for elevated creatinine\par            . \par            CC: DM2 ()\par             Possible allergy to insulin\par            .\par            Current medicatilns for diabetes:\par            .\par            glipiizide ER 2.5 mg in PM\par            metformin 1000 mg BID\par            Actos 15 mg in AM\par            .\par            Denies any recent hypoglycemia. Lowest ever was 79 mg/dl and felt OK. \par            .\par            .\par            Labs from Dr. Lino Ahn Sept 22 at [facility] in [place] (pone ) include\par            HbA1c 6.5% ***\par            Hct 45.6\par            HDL 47\par            LDL 51\par            BS 90\par            creatinine 1.38\par            BUN 16\par            potassium 4.9 \par            25 OH vit D 29 \par            .\par            Impression: Doing very well. Dr. Quintanilla has been monitoring his blood pressure and encouring walking. Wife got him FitBit and walked on [address] at [place]. \par            .\par            Plan: Same Rx and ROV six months. \par            ==\par            May 12, 2014\par            PCP: Dr. Jefry Albright\par            CC: DM2 (since ), possible allergy to insulin.\par             Recent R knee pain.\par            .\par            Meications include:\par            .\par            metformin 1000 mg BID\par            Actos 15 mg in AM.\par            glipizide ER \par            .\par            2013 lab tests show\par            FBS 93, urine microalbumin normal, \par            C-peptide 2.7\par            TSH 2.52\par            HbA1c 6.4%\par            .\par            [facility]  labs from Dr. Lino Fernández at [facility], [place] phone \par            HbA1c 6.5%\par            .\par            Today he brings computer printout of sugars and diet diary.\par            R knee injection for pain - he noted sugars went up then. \par            .\par            Also received steroid spray for coughing when exposed to cold. \par            Impression: Keeps excellent records.\par            Sugars AC are all fine.\par            PC generally good, even with Glucerna. \par            Plan: Nothing to add or change at the moment as he is doing so well.\par            U/A need to be monitored b/o Actos at present. \par            .\par            ======\par            .\par            2013\par            PCP: Dr. Jefry Albright Retina: Dr. Iker Pichardo\par            CC: DM2 (), ? insulin allergy\par            .\par            Off Bydurion.\par            On metformin 1000 mg BID\par            Actos 15 mg every AM\par            .\par            Feels Actos has helped.\par            .\par            =====\par            2013\par            PCP: Dr. Jefry Albright\par            CC: DM2 ? insulin allergy\par            .\par            Remains on \par            Bydurian weekly\par            metformin 1000 mg BID\par            .\par            Brought in his records.\par            All before meal BS excellent.\par            After meals may go into 180's max.\par            Even though still has bagels or oatmeal, post meal BS somewhat better, fructosamine WNL.\par            Dr. Albright did A1c recently, not yet available. \par            .\par            He said that [facility] Nurse originally advised the Bydurion (!) but he is thinking of trying something else. Took Actos in the past, but stopped because of fears.\par            Plan: Actos 15 mg every AM; continue the metformin 1000 mg BID.\par            He is welcome to stay on the Bydurion if he likes and is well aware of pancreatitis and pancreatic cancer issues.\par            .\par            Dr. Collado in [place] is his eye doctor. Given good report. \par            Podiatrist - Dr. Carrillo - saw himn last week. \par            GI: Colonoscopy at the end of the end of the month. \par            May 14, 2013\par            PCP: Dr. Jefry Albright\par            CC: DM2 ? insulin allergy\par            .\par            Last note appended below.\par            Remains on metformin 1000 mg BID and\par            Bydurion weekly on Sundays.\par            .\par            Although he brought careful records, he tells me that the FBS is around 110 and up to 271 after instant oatmeal. \par            .\par            He has modified his diet based on that. \par            +++\par            April 15, 2013\par            CC: DM2\par            .\par            First visit for 58 yo who works at [employer] in [place], accompanied by his wife, for DM2.\par            Presented  with "polys, weight loss, ketosis" and hospitalized at [facility] for a week. Recalls treatment with insulin and eventually Actos. ?Insulin allergy.\par            .\par            Currently on metformin 1000 mg BID and colin been on\par            Bydurion for about 3 months.\par            .\par            Blood tests from  show  mg/dl, A1c 7.7%. \par            microalbumin 61 \par            .\par            Impression: Tests fingerstick BS about 14 X a week w/o food diary.\par            Wants to know what medication will make A1c better.\par

## 2019-06-21 NOTE — PHYSICAL EXAM
[Alert] : alert [No Acute Distress] : no acute distress [Well Nourished] : well nourished [Normal Sclera/Conjunctiva] : normal sclera/conjunctiva [Well Developed] : well developed [No Proptosis] : no proptosis [EOMI] : extra ocular movement intact [Normal Oropharynx] : the oropharynx was normal [Thyroid Not Enlarged] : the thyroid was not enlarged [No Thyroid Nodules] : there were no palpable thyroid nodules [No Accessory Muscle Use] : no accessory muscle use [No Respiratory Distress] : no respiratory distress [Normal Rate] : heart rate was normal  [Clear to Auscultation] : lungs were clear to auscultation bilaterally [Regular Rhythm] : with a regular rhythm [Normal S1, S2] : normal S1 and S2 [Pedal Pulses Normal] : the pedal pulses are present [No Edema] : there was no peripheral edema [Normal Bowel Sounds] : normal bowel sounds [Soft] : abdomen soft [Not Tender] : non-tender [Not Distended] : not distended [Anterior Cervical Nodes] : anterior cervical nodes [Post Cervical Nodes] : posterior cervical nodes [No Spinal Tenderness] : no spinal tenderness [Normal] : normal and non tender [Axillary Nodes] : axillary nodes [Spine Straight] : spine straight [No Stigmata of Cushings Syndrome] : no stigmata of cushings syndrome [No Rash] : no rash [Normal Strength/Tone] : muscle strength and tone were normal [Normal Gait] : normal gait [No Tremors] : no tremors [Normal Reflexes] : deep tendon reflexes were 2+ and symmetric [Oriented x3] : oriented to person, place, and time [Acanthosis Nigricans] : no acanthosis nigricans

## 2019-06-21 NOTE — ASSESSMENT
[FreeTextEntry1] : &\par Doing a good job controlling diabetes.\par He has "pre hypothyroidism"   - surveillance for now.\kurt Labs today and ROV November.  \par He may be able to hold glipizide.  \par Had shoulder surgery May 2018.

## 2019-06-23 LAB
ALBUMIN SERPL ELPH-MCNC: 4.1 G/DL
ALP BLD-CCNC: 39 U/L
ALT SERPL-CCNC: 18 U/L
ANION GAP SERPL CALC-SCNC: 12 MMOL/L
AST SERPL-CCNC: 18 U/L
BASOPHILS # BLD AUTO: 0.02 K/UL
BASOPHILS NFR BLD AUTO: 0.3 %
BILIRUB DIRECT SERPL-MCNC: 0.1 MG/DL
BILIRUB INDIRECT SERPL-MCNC: 0.2 MG/DL
BILIRUB SERPL-MCNC: 0.3 MG/DL
BUN SERPL-MCNC: 22 MG/DL
CALCIUM SERPL-MCNC: 9.7 MG/DL
CHLORIDE SERPL-SCNC: 97 MMOL/L
CHOLEST SERPL-MCNC: 109 MG/DL
CHOLEST/HDLC SERPL: 2.7 RATIO
CO2 SERPL-SCNC: 27 MMOL/L
CREAT SERPL-MCNC: 1.69 MG/DL
CREAT SPEC-SCNC: 89 MG/DL
EOSINOPHIL # BLD AUTO: 0.07 K/UL
EOSINOPHIL NFR BLD AUTO: 1 %
ESTIMATED AVERAGE GLUCOSE: 160 MG/DL
GLUCOSE SERPL-MCNC: 104 MG/DL
HBA1C MFR BLD HPLC: 7.2 %
HCT VFR BLD CALC: 42.2 %
HDLC SERPL-MCNC: 40 MG/DL
HGB BLD-MCNC: 13.6 G/DL
IMM GRANULOCYTES NFR BLD AUTO: 0.1 %
LDLC SERPL CALC-MCNC: 44 MG/DL
LYMPHOCYTES # BLD AUTO: 3.29 K/UL
LYMPHOCYTES NFR BLD AUTO: 47.7 %
MAN DIFF?: NORMAL
MCHC RBC-ENTMCNC: 31 PG
MCHC RBC-ENTMCNC: 32.2 GM/DL
MCV RBC AUTO: 96.1 FL
MICROALBUMIN 24H UR DL<=1MG/L-MCNC: <1.2 MG/DL
MICROALBUMIN/CREAT 24H UR-RTO: NORMAL MG/G
MONOCYTES # BLD AUTO: 0.49 K/UL
MONOCYTES NFR BLD AUTO: 7.1 %
NEUTROPHILS # BLD AUTO: 3.02 K/UL
NEUTROPHILS NFR BLD AUTO: 43.8 %
PLATELET # BLD AUTO: 282 K/UL
POTASSIUM SERPL-SCNC: 4.9 MMOL/L
PROT SERPL-MCNC: 6.9 G/DL
RBC # BLD: 4.39 M/UL
RBC # FLD: 14.2 %
SODIUM SERPL-SCNC: 136 MMOL/L
T3 SERPL-MCNC: 123 NG/DL
T4 SERPL-MCNC: 6.4 UG/DL
THYROGLOB AB SERPL-ACNC: <20 IU/ML
THYROPEROXIDASE AB SERPL IA-ACNC: <10 IU/ML
TRIGL SERPL-MCNC: 124 MG/DL
TSH SERPL-ACNC: 3.86 UIU/ML
VIT B12 SERPL-MCNC: 273 PG/ML
WBC # FLD AUTO: 6.9 K/UL

## 2019-08-13 ENCOUNTER — RX RENEWAL (OUTPATIENT)
Age: 64
End: 2019-08-13

## 2019-12-20 ENCOUNTER — APPOINTMENT (OUTPATIENT)
Dept: ENDOCRINOLOGY | Facility: CLINIC | Age: 64
End: 2019-12-20
Payer: COMMERCIAL

## 2019-12-20 VITALS
BODY MASS INDEX: 33.93 KG/M2 | SYSTOLIC BLOOD PRESSURE: 122 MMHG | HEART RATE: 82 BPM | DIASTOLIC BLOOD PRESSURE: 80 MMHG | WEIGHT: 237 LBS | HEIGHT: 70 IN

## 2019-12-20 PROCEDURE — 99214 OFFICE O/P EST MOD 30 MIN: CPT

## 2019-12-20 NOTE — PHYSICAL EXAM
[Alert] : alert [No Acute Distress] : no acute distress [Well Nourished] : well nourished [Well Developed] : well developed [Normal Sclera/Conjunctiva] : normal sclera/conjunctiva [EOMI] : extra ocular movement intact [No Proptosis] : no proptosis [Normal Oropharynx] : the oropharynx was normal [No Respiratory Distress] : no respiratory distress [No Thyroid Nodules] : there were no palpable thyroid nodules [Thyroid Not Enlarged] : the thyroid was not enlarged [No Accessory Muscle Use] : no accessory muscle use [Clear to Auscultation] : lungs were clear to auscultation bilaterally [Normal Rate] : heart rate was normal  [Normal S1, S2] : normal S1 and S2 [Pedal Pulses Normal] : the pedal pulses are present [Regular Rhythm] : with a regular rhythm [No Edema] : there was no peripheral edema [Normal Bowel Sounds] : normal bowel sounds [Not Tender] : non-tender [Not Distended] : not distended [Post Cervical Nodes] : posterior cervical nodes [Soft] : abdomen soft [Anterior Cervical Nodes] : anterior cervical nodes [Normal] : normal and non tender [Axillary Nodes] : axillary nodes [No Spinal Tenderness] : no spinal tenderness [No Stigmata of Cushings Syndrome] : no stigmata of cushings syndrome [Spine Straight] : spine straight [Normal Gait] : normal gait [Normal Strength/Tone] : muscle strength and tone were normal [No Rash] : no rash [No Tremors] : no tremors [Acanthosis Nigricans] : no acanthosis nigricans [Normal Reflexes] : deep tendon reflexes were 2+ and symmetric [Oriented x3] : oriented to person, place, and time

## 2019-12-20 NOTE — HISTORY OF PRESENT ILLNESS
[FreeTextEntry1] : Dec 20, 2019\par \par PCP: Dr. Abdifatah Cutler at [facility] f [phone]\par             Eyes: Dr. Tobias Pichardo 2015, 2017 p [phone]\par             Card: Dr. Alvarez for EST (retired)\par             GI: Dr. Zimmerman - 2014 - +FHx colon cancer age 61\par             Ortho: Dr. Cisneros #2 for knee surgery\par             -> Dr. Howard Red p \par             Nephro: Dr. Lino Ahn - since  for elevated creatinine - retired\par             phone: [phone] [place]  fax: [phone]  now Dr. Jarrell Frye \par             Feet: Dr. Carrillo in  [place] - Hx bunion surgery bilateral\par             Chiropractor: Dr. Wilber Zambrano phone [phone]\par            .\par            CC: Type 2 diabetes () 2016 A1c 7.2 %\par             -"trace retinopathy" - Dr. Lara in [place]  last 2019\par             Possible allergy to insulin\par             Early hypothyroidism - TSH 6.46 2016 -> 2.89 (3/2016; \par             TPO ab neg) \par             (CRI: creatinine 1.48, urine microalbumin - negative)\par             2017 fall: R rotator cuff and R wrist injury\par            .\par  A1c 7.2 %\par Recent labs from [facility] - Dr. Howard Frye \par 2019 included\par A1c 6.7 %\par TSH 3.0\par vit D 72 **\par creatinine 1.63 **\par \par Remains on glipizide ER 2.5 in PM\par pioglitazone 15 mg in PM\par JanuMet  BID\par amlodipine\par Cialis 5 mg prn\par \par Impression:   TSH has been slightly elevated in the past, now back in normal range.\par Using Leonora at $60/2\par \par Plan:  Same Rx.\par \par ROV in April or May.  \par \par \par \par \par \par 2019\par \par  PCP: Dr. Abdifatah Cutler at [facility] [phone]\par             Eyes: Dr. Tobias Pichardo 2015, 2017 p [phone]\par             Card: Dr. Alvarez for EST (retired)\par             GI: Dr. Zimmerman - 2014 - +FHx colon cancer age 61\par             Ortho: Dr. Cisneros #2 for knee surgery\par             -> Dr. Howard Red p \par             Nephro: Dr. Lino Ahn - since  for elevated creatinine - retired\par             phone: [phone] [place]  fax: [phone]  now Dr. Jarrell Frye \par             Feet: Dr. Carrillo in [facility] - Hx bunion surgery bilateral\par             Chiropractor: Dr. Wilber Zambrano phone [phone]\par            .\par            CC: Type 2 diabetes () 2016 A1c 7.2 %\par             -"trace retinopathy" - Dr. Lara in [place]  last 2019\par             Possible allergy to insulin\par             Early hypothyroidism - TSH 6.46 2016 -> 2.89 (3/2016; \par             TPO ab neg) \par             (CRI: creatinine 1.48, urine microalbumin - negative)\par             2017 fall: R rotator cuff and R wrist injury\par            .\par Last here in 2019 and switched from Leonora "10" to Leonora 14.\par Labs from January included creatinine 1.45, glucose 145 and A1c 7.1 %\par 2018 TSH 4.57 (on no Rx)\par \par Medications include:\par  glipizide ER 2.5 mg in PM           *****\par   pioglitazone 15 mg every PM\par  JanuMet  BID                *****\par   Cialis 5 mg prn\par   Amlodipine\par \par \par \par Brings in original Leonora Addison and given coupon for Leonora 14   \par Being followed at [facility] in [place] Fax [phone] - Dr. Jarrell Frye \par Recent [id number] 1.62 \par 25 OH vit D 37\par B12 344 \par uric acid 7.4\par Mg 1.6\par TSH 4.57 ***  (stable, abs neg)\par \par For diabetes, taking:\par \par    glipizide ER 2.5 mg in PM\par            pioglitazone 15 mg every PM\par            JanuMet  BID\par             Cialis 5 mg prn\par             Amlodipine\par \par He was given good report by his ophthalmologist. \par \par Plan:  . A1c today.  \par \par \par \par Previous notes from [employer] appended below.\par \par  2018\par            .\par            PCP: Dr. Abdifatah Cutler at [facility] [phone]\par             Eyes: Dr. Tobias Pichardo 2015, 2017 p [phone]\par             Card: Dr. Alvarez for EST\par             GI: Dr. Zimmerman - 2014 - +FHx colon cancer age 61\par             Ortho: Dr. Cisneros #2 for knee surgery\par             -> Dr. Howard Red p \par             Nephro: Dr. Lino Ahn - since  for elevated creatinine\par             phone: [phone] [place]\par             fax: [phone]\par             now Dr. Jarrell Frye \par             Feet: Dr. Carrillo in [facility] -  bunion surgery bilat\par             Chiropractor: Dr. Wilber Zambrano phone [phone]\par            .\par            CC: Type 2 diabetes () 2016 A1c 7.2 %\par             -"trace retinopathy" will see 2018\par             Possible allergy to insulin\par             Early hypothyroidism - TSH 6.46 2016 -> 2.89 (3/2016; \par             TPO ab neg) \par             (CRI: creatinine 1.48, urine microalb - neg)\par             2017 fall: R rotator cuff and R wrist injury\par            .\par            Reviewed Leonora.\par            Spikes after [employer] [place] [employer]. from [employer] in [place]\par            .\par            glipizide ER 2.5 mg in PM\par            Actos 15 mg every PM\par            JanuMet  BID\par             Cialis 5 mg prn\par             Amlodipine\par            .\par            Eyes checked by Dr. Collado in 2018 and again 2019 \par            .\par            ==\par            .\par            2018\par            .\par            PCP: Dr. Abdifatah Cutler at [facility] [phone]\par             Eyes: Dr. Tobias Pichardo 2015, 2017 p [phone]\par             Card: Dr. Alvarez for EST\par             GI: Dr. Zimmerman - 2014 - +FHx colon cancer age 61\par             Ortho: Dr. Cisneros #2 for knee surgery\par             -> Dr. Howard Red p \par             Nephro: Dr. Lino Ahn - since  for elevated creatinine\par             phone: [phone] [place]\par             fax: [phone]\par             now Dr. Jarrell Frye to see 2018\par             Feet: Dr. Carrillo in [facility] -  bunion surgery bilat\par             Chiropractor: Dr. Wilber Zambrano phone [phone]\par            .\par            CC: Type 2 diabetes () 2016 A1c 7.2 %\par             -"trace retinopathy" will see 2018\par             Possible allergy to insulin\par             Early hypothyroidism - TSH 6.46 2016 -> 2.89 (3/2016; \par             TPO ab neg) \par             (CRI: creatinine 1.48, urine microalb - neg)\par             2017 fall: R rotator cuff and R wrist injury\par            ..\par            Using [employer]\par            .\par            Meds:\par            .\par            glipizide ER 2.5 mg in PM\par            Actos 15 mg every PM\par            JanuMet  BID\par             Cialis 5 mg prn\par             Amlodipine\par            .\par            Instructed with wife regarding Leonora.\par            Discussed reactive hypoglycemia after Oatmeal. \par            ROV one month. \par            .\par            ==\par            .\par            2018\par            .\par            PCP: Dr. Abdifatah Cutler at [facility] [phone]\par             Eyes: Dr. Tobias Pichardo 2015, 2017 p [phone]\par             Card: Dr. Alvarez for EST\par             GI: Dr. Zimmerman - 2014 - +FHx colon cancer age 61\par             Ortho: Dr. Cisneros #2 for knee surgery\par             -> Dr. Howard Red p \par             Nephro: Dr. Lino Ahn - since  for elevated creatinine\par             phone: [phone] [place]\par             fax: [phone]\par             now Dr. Jarrell Frye \par             Feet: Dr. Carrillo in [facility] -  bunion surgery bilat\par             Chiropractor: Dr. Wilber Zambrano phone [phone]\par            .\par            CC: Type 2 diabetes () 2016 A1c 7.2 %\par             -"trace retinopathy"\par             Possible allergy to insulin\par             Early hypothyroidism - TSH 6.46 2016 -> 2.89 (3/2016; \par             TPO ab neg) \par             (CRI: creatinine 1.48, urine microalb - neg)\par             2017 fall: R rotator cuff and R wrist injury\par            .\par            2017 fell on black ice.\par            Today wearing sling R arm\par            Father 95 yo  past week in [facility].\par            .\par            Freestyle Leonora arrived last week, but he did not bring it in today. \par            .\par            Lab tests from last visit\par            2018 iincluded\par            A1c 6.9 %\par            TSH 2.61\par            creatinine 1.3 \par            microalbumin < 0.3 \par            .\par            Labs from\par            [facility] from\par            5/10/2018 included\par            glucosed 101 mg/dl\par            creatinine 1.55\par            A1c 7.2 % \par            .\par            Taking:\par             glipizide ER 2.5 mg in PM\par             Actos 15 mg every PM\par             JanuMet  BID\par             Cialis 5 mg prn\par             Amlodipin\par            .\par            Brings in records from \par            [facility] \par            .\par            ==\par            .\par            2018\par            .\par            PCP: Dr. Abdifatah Cutler at [facility] f [phone]\par             Eyes: Dr. Tobias Pichardo 2015, 2017 p [phone]\par             Card: Dr. Alvarez for EST\par             GI: Dr. Zimmerman - 2014 - +FHx colon cancer age 61\par             Ortho: Dr. Cisneros #2 for knee surgery\par             -> Dr. Howard Red p \par             Nephro: Dr. Lino Ahn - since  for elevated creatinine\par             phone: [phone] [place]\par             fax: [phone]\par             Feet: Dr. Carrillo in [facility] - Hx bunion surgery bilat\par             Chiropractor: Dr. Wilber Zambrano phone [phone]\par            .\par            CC: Type 2 diabetes () 2016 A1c 7.2 %\par             Possible allergy to insulin\par             Early hypothyroidism - TSH 6.46 2016 -> 2.89 (3/2016; \par             TPO ab neg) \par             (CRI: creatinine 1.48, urine microalb - neg)\par            .\par            # Has been told of possible spinal stenosis\par            # Recent slip on ice with R hand ? ulna and\par             R shoulder rotator cuff injury and anticipates surgery. \par            # Diabetes\par             Labs from Sept included glucose 88\par             A1c 6. 2\par            . Will see optho tomorrow.\par             Meds: glipizide ER 2.5 mg in PM\par             Actos 15 mg every PM\par             JanuMet  BID\par             Cialis 5 mg prn\par             Amlodipine\par            .\par            Impression: He is doing well; however, a bit down over all of above.\par            .\par            Plan: Same Rx.\par            ROV 3-4 months. \par            .\par            .\par            ==\par            .\par            2017\par            .\par            PCP: Dr. Abdifatah Cutler at [facility] [phone]\par             Eyes: Dr. Tobias Pichardo 2015, 2017 p [phone]\par             Card: Dr. Alvarez for EST\par             GI: Dr. Zimmerman - 2014 - +FHx colon cancer age 61\par             Ortho: Dr. Cisneros #2 for knee surgery\par             -> Dr. Howard Red p \par             Nephro: Dr. Lino Ahn - since  for elevated creatinine\par             phone: [phone] [place]\par             fax: [phone]\par             Feet: Dr. Carrillo in [facility] - Hx bunion surgery bilat\par             Chiropractor: Dr. Wilber Zambrano phone [phone]\par            .\par            CC: Type 2 diabetes () 2016 A1c 7.2 %\par             Possible allergy to insulin\par             Early hypothyroidism - TSH 6.46 2016 -> 2.89 (3/2016; \par             TPO ab neg) \par             (CRI: creatinine 1.48, urine microalb - neg)\par            .\par            Low back has been bothering him and saw his chiropractor, Wilber Zambrano and had x-rays and manipulation. He would like to see pain management and consider accupuncture.\par            .\par            Returns for diabetes. \par            .\par             Last available labs are from \par            2017 and include\par            TSH 4.93\par            creatinine 1.27 \par            .\par            Eye exam in January by Dr. Hubert Lara showed trace retinopathy.\par            .\par            He is currently taking:\par            .\par            glipizide ER 2.5 mg in PM\par            Actos 15 mg every PM\par            JanuMet  BID\par            Cialis 5 mg prn\par            Amlodipine\par            .\par            Impression: Stable borderline elevation of TSH. Will repeat in future. Thyroid antibodies have been negative. He has not yet made it for ultrasound of thyroid.\par            .\par            No fingerstick BS today, but he reports sugars in good range. He will see ophthalmology in January and has been told of early retinopathy. \par            .\par            For decreaed GFR, he sees his kidney doctor regularly. He will see his primary care, Dr. Cutler, in the near future, for annual physical.\par            .\par            Plan: A1c, urine microalbumin today and ROV January. Thank you. -\par            .\par            .\par            ==\par            .\par            May 11,2017\par            .\par            PCP: Dr. Abdifatah Cutler at [facility] f [phone]\par             Eyes: Dr. Tobias Pichardo 2015, 2017 p [phone]\par             Card: Dr. Alvarez for EST\par             GI: Dr. Zimmerman - 2014 - +FHx colon cancer age 61\par             Ortho: Dr. Cisneros #2 for knee surgery\par             -> Dr. Howard Red p \par             Nephro: Dr. Lino Ahn - since  for elevated creatinine\par             phone:  [place]\par             fax: [phone]\par             Feet: Dr. Carrillo in [facility] - Hx bunion surgery bilat\par            .\par            CC: Type 2 diabetes () 2016 A1c 7.2 %\par             Possible allergy to insulin\par             Early hypothyroidism - TSH 6.46 2016 -> 2.89 (3/2016; \par             TPO ab neg) \par             (CRI: creatinine 1.48, urine microalb - neg)\par            .\par            , R TKR - Dr. Howard Nam at [facility]\par             Re\par            .\par            Meds include:\par            .\par            glipizide ER 2.5 mg in PM\par             Actos 15 mg every PM\par             JanuMet  BID\par             Cialis 5 mg prn\par             Amlodipine\par            .\par            [facility]\par            \par            TSH 4.93\par            creatinine 1.27\par            A1c 6.4 % Dr. Lino Ahn sent to Dr. Gregorio\par            .\par            ==\par            .\par            2017\par            .\par            PCP: Dr. Abdifatah Ctuler at [facility] f [phone]\par             Eyes: Dr. Tobias Pichardo 2015, 2017 p [phone]\par             Card: Dr. Alvarez for EST\par             GI: Dr. Zimmerman - 2014 - +FHx colon cancer age 61\par             Ortho: Dr. Cisneros #2 for knee surgery\par             -> Dr. Howard Red p \par             Nephro: Dr. Lino Ahn - since  for elevated creatinine\par             phone:  [place]\par             fax: [phone]\par             Feet: Dr. Carrillo in [facility] bunion surgery bilat\par            .\par            CC: Type 2 diabetes () 2016 A1c 7.2 %\par             Possible allergy to insulin\par             Early hypothyroidism - TSH 6.46 2016 -> 2.89 (3/2016; \par             TPO ab neg) \par             (CRI: creatinine 1.48, urine microalb - neg)\par            .\par            Medications include:\par            .\par            glipizide ER 2.5 mg in PM\par             Actos 15 mg every PM\par             JanuMet  BID\par             Cialis 5 mg prn\par             Amlodipine\par            .\par            Today Mr. Ochoa kindly brought in lab tests from\par            2017 which included\par            PTH 53\par            TSH 3.35\par            HbA1c 7.5 %\par            urine microalbumin ratio 29 (0-30) \par            creatinine 1.4\par            \par            Hct 40 \par            .\par            His eye doctor, Dr. Collado, saw a small yellow spot. \par            .\par            His fingerstick BS show good FBS most of the time \par            .\par            Impression: He attributes A1c rise to stress of preparing taxes at this time of the year.\par            .\par            Plan: Same Rx plus stress reduction. \par            Diabetes under good control for R TKR at [facility]. \par            ROV 4 months. \par            .\par            ==\par            .\par            2016\par            .\par            PCP: Dr. Abdifatah Cutler at [facility] f [phone]\par             Eyes: Dr. Tobias Pichardo 2015\par             Card: Dr. Alvarez for EST\par             GI: Dr. Zimmerman - 2014 - +FHx colon cancer age 61\par             Ortho: Dr. Cisneros #2 for knee surgery\par             -> Dr. Howard Red p \par             Nephro: Dr. Lino Ahn - since  for elevated creatinine\par             phone: [phone] [place]\par             fax: [phone]\par             Feet: Dr. Carrillo in [facility] bunion surgery bilat\par            .\par            CC: DM2 () 2016 A1c 7.2 %\par             Possible allergy to insulin\par             Early hypothyroidism - TSH 6.46 2016 -> 2.89 (3/2016; \par             TPO ab neg) \par             (CRI: creatinine 1.48, urine microalb - neg)\par            .\par            Told recent A1c 6.6 %\par            .\par            Will see Dr. Cutler in October \par            .\par            glipizide ER 2.5 mg in PM\par             Actos 15 mg every PM\par             JanuMet  BID\par             Cialis 5 mg prn\par             Amlodipine\par            .\par            Dr. Patel did blood end of August. \par            .\par            Impression: Decreased GFR - followed by Dr. Ahn.\par            Plan: Decrease the JanuMet  to once a day at next visit in January.\par            .\par            .\par            .\par            ==\par            .\par            Conchita 15, 2016\par            .\par            PCP: Dr. Abdifatah Cutler at [facility] f [phone]\par             Eyes: Dr. Tobias Pichardo 2015\par             Card: Dr. Alvarez for EST\par             GI: Dr. Cisneros - 2014 - +FHx colon cancer age 61\par             Ortho: Dr. Cisneros #2 for knee surgery\par             -> Dr. Howard Red p \par             Nephro: Dr. Lino Ahn - since  for elevated creatinine\par             phone: [phone] [place]\par             fax: [phone]\par             Feet: Dr. Carrillo in [facility] - Hx bunion surgery bilat\par            .\par            CC: DM2 () 2016 A1c 7.2 %\par             Possible allergy to insulin\par             Early hypothyroidism - TSH 6.46 2016 -> 2.89 (3/2016; \par             TPO ab neg) \par             (CRI: creatinine 1.48, urine microalb - neg)\par            .\par            Required injection of steroids into R knee with some benefit.\par            Has had 5 knee surgeries in the past. \par            .\par            Diabetes medications remain:\par            .\par             glipizide ER 2.5 mg in PM\par             Actos 15 mg every PM\par             JanuMet  BID\par             Cialis 5 mg prn\par             Amlodipine\par            .\par            Self tests with [employer] [facility] and brings fingerstick BS.\par            FBS all normal. After dinner, as high as 205, 175 , 172, 279, 263, 207, 184 mg/dl.\par            .\par            Gets strips from [facility] in [place] on [address]par            .\par            Impression: Walking less b/o R knee pain.\par            Had recent URI.\par            .\par            Plan: Same Rx. Review Meloxicam with Renal.\par            ROV 3 months. \par            .\par            .\par            .\par            ==\par            .\par            2016\par            .\par            PCP: Dr. Abdifatah Cutler at [facility] [phone]\par             Eyes: Dr. Tobias Pichardo 2015\par             Card: Dr. Alvarez for EST\par             GI: Dr. Cisneros - 2014 - +FHx colon cancer age 61\par             Ortho: Dr. Cisneros #2 for knee surgery\par             Nephro: Dr. Lino Ahn - since  for elevated creatinine\par             phone: [phone] [place]\par             fax: [phone]\par             Feet: Dr. Carrillo in [facility] - Hx bunion surgery bilat\par            .\par            CC: DM2 ()\par             Possible allergy to insulin\par            .\par            Labs ordered by Dr. Lino Ahn at [facility] on 2016 are kindly provided by Mr. Ochoa and include:\par            .\par            25 OH vit D 29\par             mg/dl (109 in Nov)\par            creatinine 1.48 *** (1.43 in Nov)\par            uric acid 5.9\par            T4 7.1\par            TSH 6.46 *** (2.82 in Nov) \par            HbA1c 7.2 % (7.1 % in Nov)\par            PTH 71 (11-67)\par            .\par            Medications include:\par            .\par             glipizide ER 2.5 mg in PM\par             Actos 15 mg every PM\par             JanuMet  BID\par             Cialis 5 mg prn\par             Amlodipine 5 mg daily and that\par            Testing with [employer] meter. \par            He notes that "CranRaisins" in hiss Tossed tuna salad/cheese cause BS to go from  mg/dl \par            But Oxtail and Brown Rice/salad caused BS 85 to go to 198\par            .\par            Impression: \par            # Pre-hypothyroid Plan: repeat TSH, abs, ultrasound\par            # Diabetes under very good control. Probable insulin allergy.\par             May need to stop the glipizide.\par            ROV 3 1/2 months. \par            .\par            ==\par            .\par            Dec 9, 2015\par            .\par            PCP: Dr. Abdifatah Cutler at [facility] f [phone]\par             Eyes: Dr. Tobias Pichardo 2015\par             Card: Dr. Alvarez for EST\par             GI: Dr. Cisneros - 2014 - +FHx colon cancer age 61\par             Ortho: Dr. Cisneros #2 for knee surgery\par             Nephro: Dr. Lino Ahn - since  for elevated creatinine\par             phone: [phone] [place]\par             fax: [phone]\par             Feet: Dr. Carrillo in [facility] - Hx bunion surgery bilat\par            .\par            CC: DM2 ()\par             Possible allergy to insulin\par            .\par            Note from August appended below.\par            Has URI with cough. => elev sugar\par            Mr. Ochoa kindly brings in report from Dr. Lino Ahn at [facility] which includes lab studies:\par             mg/dl\par            BUN 15\par            creatinine 1.43 **\par             ()\par            HbA1c 7.1% \par            TSH 2.82\par            .\par            Medications inlcude:\par            .\par            glipizide ER 2.5 mg in PM\par            Actos 15 mg every PM\par            Metformin 1000 mg BID -> JanuMet  BID\par            Cialis 5 mg daily\par            Amlodipine 5 mg daily and that\par            .\par            Impression: Doing well. He enjoys a fair amount of starch.\par            .\par            Plan: Test AC and PC\par            ROV 4 months. -jh\par            .\par            .\par            .\par            ==\par            .\par            2015\par            .\par            PCP: Dr. Abdifatah Cutler at [facility] [phone]\par             Eyes: Dr. Tobias Pichardo 2015\par             Card: Dr. Alvarez for EST\par             GI: Dr. Cisneros - 2014 - +FHx colon cancer age 61\par             Ortho: Dr. Cisneros #2 for knee surgery\par             Nephro: Dr. 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 Lino Ahn. Dr. Ahn reports patient is newly taking \par            Valsartan 320 mg daily and remains on\par            Vitamin D 1000 units daily\par            Atorvastatin 10 mg daily\par            Actos 15 mg every PM\par            Metformin 1000 mg BID -> JanuMet  BID\par            Cialis 5 mg daily\par            Amlodipine 5 mg daily and that\par            Diovan was discontinued. \par            .\par            He also takes glipizide ER 2.5 mg in PM\par            .\par            Also included are blood tests which show:\par            glucose 104 mg/dl\par            BUN 13\par            creatinine 1.4\par            potassium 4.7\par            T4 6.5\par            TSH 3.54\par            PTH 75.5 (<67)\par            HbA1c 6.4% *********** (down from 7.7% in 2013)\par            .\par            Impression: Left his fingerstick BS at home. But he reports that FBS around  and when he checks 2 hours after meals BS usually up to 140, but as high as 175 mg/dl if has pancakes. In sum, doing very well.\par            .\par            Plan: Same diabetic Rx. Bring fingerstick BS to next visit.\par            .\par            ==\par            .\par            May 12, 2015\par            .\par            PCP: Dr. Abdifatah Cutler at [facility] [phone]\par             Eyes: Dr. Tobias Pichardo 2015\par             Card: Dr. Alvarez for EST\par             GI: Dr. Cisneros - 2014 - +FHx colon cancer age 61\par             Ortho: Dr. Cisneros #2 for knee surgery\par             Nephro: Dr. Lino hAn - since  for elevated creatinine\par             phone: [phone] [place]\par             fax: [phone]\par             .\par            . \par            CC: DM2 ()\par             Possible allergy to insulin\par            .\par            Labs kindly provided by Dr. Ahn show\par             m/dl\par            creatinine 1.42\par            TSH 3.41\par            HgbA1c 7.1%\par            . \par            Diabetes meds:\par            .\par            glipiizide ER 2.5 mg in PM\par            metformin 1000 mg BID\par            Actos 15 mg in AM\par            .\par            Impression. Doing well. \par            May have 10:30 am reactive hypoglycemia (vs glipizide effect).\par            Plan: add Januvia 50 mg in AM as JanuMet.\par            .\par            ROV 4 months.\par            .\par            =======\par            Nov 10, 2014\par            PCP: Dr. Jefry Albright ([facility])\par             Eyes: Dr. Tobias Pichardo\par             Card: Dr. Alvarez for EST\par             GI: Dr. Cisneros - 2014 - +FHx colon cancer age 61\par             Nephro: Dr. Ahn - since  for elevated creatinine\par            . \par            CC: DM2 ()\par             Possible allergy to insulin\par            .\par            Current medicatilns for diabetes:\par            .\par            glipiizide ER 2.5 mg in PM\par            metformin 1000 mg BID\par            Actos 15 mg in AM\par            .\par            Denies any recent hypoglycemia. Lowest ever was 79 mg/dl and felt OK. \par            .\par            .\par            Labs from Dr. Lino Ahn  at [facility] in [place] (pone ) include\par            HbA1c 6.5% ***\par            Hct 45.6\par            HDL 47\par            LDL 51\par            BS 90\par            creatinine 1.38\par            BUN 16\par            potassium 4.9 \par            25 OH vit D 29 \par            .\par            Impression: Doing very well. Dr. Quintanilla has been monitoring his blood pressure and encouring walking. Wife got him FitBit and walked on [address] at [place]. \par            .\par            Plan: Same Rx and ROV six months. \par            ==\par            May 12, 2014\par            PCP: Dr. Jefry Albright\par            CC: DM2 (since ), possible allergy to insulin.\par             Recent R knee pain.\par            .\par            Meications include:\par            .\par            metformin 1000 mg BID\par            Actos 15 mg in AM.\par            glipizide ER \par            .\par            2013 lab tests show\par            FBS 93, urine microalbumin normal, \par            C-peptide 2.7\par            TSH 2.52\par            HbA1c 6.4%\par            .\par            [facility]  labs from Dr. Lino Fernández at [facility] phone \par            HbA1c 6.5%\par            .\par            Today he brings computer printout of sugars and diet diary.\par            R knee injection for pain - he noted sugars went up then. \par            .\par            Also received steroid spray for coughing when exposed to cold. \par            Impression: Keeps excellent records.\par            Sugars AC are all fine.\par            PC generally good, even with Glucerna. \par            Plan: Nothing to add or change at the moment as he is doing so well.\par            U/A need to be monitored b/o Actos at present. \par            .\par            ======\par            .\par            2013\par            PCP: Dr. Jefry Albright Retina: Dr. Iker Pichardo\par            CC: DM2 (), ? insulin allergy\par            .\par            Off Bydurion.\par            On metformin 1000 mg BID\par            Actos 15 mg every AM\par            .\par            Feels Actos has helped.\par            .\par            =====\par            2013\par            PCP: Dr. Jefry Albright\par            CC: DM2 ? insulin allergy\par            .\par            Remains on \par            Bydurian weekly\par            metformin 1000 mg BID\par            .\par            Brought in his records.\par            All before meal BS excellent.\par            After meals may go into 180's max.\par            Even though still has bagels or oatmeal, post meal BS somewhat better, fructosamine WNL.\par            Dr. Albright did A1c recently, not yet available. \par            .\par            He said that [facility] Nurse originally advised the Bydurion (!) but he is thinking of trying something else. Took Actos in the past, but stopped because of fears.\par            Plan: Actos 15 mg every AM; continue the metformin 1000 mg BID.\par            He is welcome to stay on the Bydurion if he likes and is well aware of pancreatitis and pancreatic cancer issues.\par            .\par            Dr. Collado in [place] is his eye doctor. Given good report. \par            Podiatrist - Dr. Carrillo - saw himn last week. \par            GI: Colonoscopy at the end of the end of the month. \par            May 14, 2013\par            PCP: Dr. Jefry Albright\par            CC: DM2 ? insulin allergy\par            .\par            Last note appended below.\par            Remains on metformin 1000 mg BID and\par            Bydurion weekly on Sundays.\par            .\par            Although he brought careful records, he tells me that the FBS is around 110 and up to 271 after instant oatmeal. \par            .\par            He has modified his diet based on that. \par            +++\par            April 15, 2013\par            CC: DM2\par            .\par            First visit for 58 yo who works at [employer] in [email or web address], accompanied by his wife, for DM2.\par            Presented  with "polys, weight loss, ketosis" and hospitalized at [facility] for a week. Recalls treatment with insulin and eventually Actos. ?Insulin allergy.\par            .\par            Currently on metformin 1000 mg BID and colin been on\par            Bydurion for about 3 months.\par            .\par            Blood tests from  show  mg/dl, A1c 7.7%. \par            microalbumin 61 \par            .\par            Impression: Tests fingerstick BS about 14 X a week w/o food diary.\par            Wants to know what medication will make A1c better.\par

## 2020-05-21 ENCOUNTER — APPOINTMENT (OUTPATIENT)
Dept: ENDOCRINOLOGY | Facility: CLINIC | Age: 65
End: 2020-05-21
Payer: COMMERCIAL

## 2020-05-21 PROCEDURE — 99214 OFFICE O/P EST MOD 30 MIN: CPT | Mod: 95

## 2020-05-21 RX ORDER — ATORVASTATIN CALCIUM 10 MG/1
10 TABLET, FILM COATED ORAL TWICE DAILY
Qty: 180 | Refills: 1 | Status: ACTIVE | COMMUNITY
Start: 1900-01-01 | End: 1900-01-01

## 2020-05-21 NOTE — HISTORY OF PRESENT ILLNESS
[Home] : at home, [unfilled] , at the time of the visit. [Medical Office: ([facility])___] : at the medical office located in  [Verbal consent obtained from patient] : the patient, [unfilled] [FreeTextEntry1] : May 21, 2020   VideoChat  Esteban \par \par PCP: Dr. Abdifatah Cutler at [facility] [phone]\par             Eyes: Dr. Tobias Pichardo 2015, 2017 p [phone]\par             Card: Dr. Alvarez for EST (retired)\par             GI: Dr. Zimmerman - 2014 - +FHx colon cancer age 61\par             Ortho: Dr. Cisneros #2 for knee surgery\par             -> Dr. Howard Red p \par             Nephro: Dr. Lino Ahn - since  for elevated creatinine - retired\par             phone: [phone] [place]  fax: [phone]  now Dr. Jarrell Frye \par             Feet: Dr. Carrillo in [facility] - Hx bunion surgery bilateral\par             Chiropractor: Dr. Wilber Zambrano phone [phone]\par            .\par            CC: Type 2 diabetes () 2016 A1c 7.2 %\par             -"trace retinopathy" - Dr. Lara in [place]  last 2019\par             Possible allergy to insulin\par             Early hypothyroidism - TSH 6.46 2016 -> 2.89 (3/2016; \par             TPO ab neg) \par             (CRI: creatinine 1.48, urine microalbumin - negative)\par             2017 fall: R rotator cuff and R wrist injury\par            .\par Dr. Cutler had him tested for [place] b/o cough....still has it.\par Had recent eye examby Dr. Lara - given good report.  \par \par Gets Leonora from [facility]\par \par \par \par Dec 20, 2019\par \par PCP: Dr. Abdifatah Cutler at [facility] [phone]\par             Eyes: Dr. Tobias Pichardo 2015, 2017 p [phone]\par             Card: Dr. Alvarez for EST (retired)\par             GI: Dr. Zimmerman - 2019 - +FHx colon cancer age 61\par             Ortho: Dr. Cisneros #2 for knee surgery\par             -> Dr. Howard Red p \par             Nephro: Dr. Lino Ahn - since  for elevated creatinine - retired\par             phone: [phone] [place]  fax: [phone]  now Dr. Jarrell Frye \par             Feet: Dr. Carrillo in [facility] - Hx bunion surgery bilateral\par             Chiropractor: Dr. Wilber Zambrano phone [phone]\par            .\par            CC: Type 2 diabetes () 2016 A1c 7.2 %\par             -"trace retinopathy" - Dr. Lara in [place]  last 2019\par             Possible allergy to insulin\par             Early hypothyroidism - TSH 6.46 2016 -> 2.89 (3/2016; \par             TPO ab neg) \par             (CRI: creatinine 1.48, urine microalbumin - negative)\par             2017 fall: R rotator cuff and R wrist injury\par            .\par  A1c 7.2 %\par Recent labs from [facility] - Dr. Howard Frye \par 2019 included\par A1c 6.7 %\par TSH 3.0\par vit D 72 **\par creatinine 1.63 **\par \par Remains on glipizide ER 2.5 in PM\par pioglitazone 15 mg in PM\par JanuMet  BID\par amlodipine\par Cialis 5 mg prn\par \par Impression:   TSH has been slightly elevated in the past, now back in normal range.\par Using Leonora at $60/2\par \par Plan:  Same Rx.\par \par ROV in April or May.  \par \par \par \par \par \par 2019\par \par  PCP: Dr. Abdifatah Cutler at [facility] [phone]\par             Eyes: Dr. Tobias Pichardo 2015, 2017 p [phone]\par             Card: Dr. Alvarez for EST (retired)\par             GI: Dr. Zimmerman - 2014 - +FHx colon cancer age 61\par             Ortho: Dr. Cisneros #2 for knee surgery\par             -> Dr. Howard Red p \par             Nephro: Dr. Lino Ahn - since  for elevated creatinine - retired\par             phone:  [place]  fax: [phone]  now Dr. Jarrell Frye \par             Feet: Dr. Carrillo in [facility] - Hx bunion surgery bilateral\par             Chiropractor: Dr. Wilber Zambrano phone [phone]\par            .\par            CC: Type 2 diabetes () 2016 A1c 7.2 %\par             -"trace retinopathy" - Dr. Lara in [place]  last 2019\par             Possible allergy to insulin\par             Early hypothyroidism - TSH 6.46 2016 -> 2.89 (3/2016; \par             TPO ab neg) \par             (CRI: creatinine 1.48, urine microalbumin - negative)\par             2017 fall: R rotator cuff and R wrist injury\par            .\par Last here in 2019 and switched from Leonora "10" to Leonora 14.\par Labs from January included creatinine 1.45, glucose 145 and A1c 7.1 %\par 2018 TSH 4.57 (on no Rx)\par \par Medications include:\par  glipizide ER 2.5 mg in PM           *****\par   pioglitazone 15 mg every PM\par  JanuMet  BID                *****\par   Cialis 5 mg prn\par   Amlodipine\par \par \par \par Brings in original Leonora [place] and given coupon for Leonora 14   \par Being followed at [facility] in [place] Fax [phone] - Dr. Jarrell Frye \par Recent [id number] 1.62 \par 25 OH vit D 37\par B12 344 \par uric acid 7.4\par Mg 1.6\par TSH 4.57 ***  (stable, abs neg)\par \par For diabetes, taking:\par \par    glipizide ER 2.5 mg in PM\par            pioglitazone 15 mg every PM\par            JanuMet  BID\par             Cialis 5 mg prn\par             Amlodipine\par \par He was given good report by his ophthalmologist. \par \par Plan:  . A1c today.  \par \par \par \par Previous notes from [employer] appended below.\par \par  2018\par            .\par            PCP: Dr. Abdifatah Cutler at [facility] [phone]\par             Eyes: Dr. Tobias Pichardo 2015, 2017 p [phone]\par             Card: Dr. Alvarez for EST\par             GI: Dr. Zimmerman - 2014 - +FHx colon cancer age 61\par             Ortho: Dr. Cisneros #2 for knee surgery\par             -> Dr. Howard Red p \par             Nephro: Dr. Lino Ahn - since  for elevated creatinine\par             phone: [phone] [place]\par             fax: [phone]\par             now Dr. Jarrell Frye \par             Feet: Dr. Carrillo in [facility] -  bunion surgery bilat\par             Chiropractor: Dr. Wilber Zambrano phone [phone]\par            .\par            CC: Type 2 diabetes () 2016 A1c 7.2 %\par             -"trace retinopathy" will see 2018\par             Possible allergy to insulin\par             Early hypothyroidism - TSH 6.46 2016 -> 2.89 (3/2016; \par             TPO ab neg) \par             (CRI: creatinine 1.48, urine microalb - neg)\par             2017 fall: R rotator cuff and R wrist injury\par            .\par            Reviewed Leonora.\par            Spikes after [place] [employer]. from [facility] in [place]\par            .\par            glipizide ER 2.5 mg in PM\par            Actos 15 mg every PM\par            JanuMet  BID\par             Cialis 5 mg prn\par             Amlodipine\par            .\par            Eyes checked by Dr. Collado in 2018 and again 2019 \par            .\par            ==\par            .\par            2018\par            .\par            PCP: Dr. Abdifatah Cutler at [facility] [phone]\par             Eyes: Dr. Tobias Pichardo 2015, 2017 p [phone]\par             Card: Dr. Alvarez for EST\par             GI: Dr. Zimmerman - 2014 - +FHx colon cancer age 61\par             Ortho: Dr. Cisneros #2 for knee surgery\par             -> Dr. Howard Red p \par             Nephro: Dr. Lino Ahn - since  for elevated creatinine\par             phone:  [place]\par             fax: [phone]\par             now Dr. Jarrell Frye to see 2018\par             Feet: Dr. Carrillo in [facility] bunion surgery bilat\par             Chiropractor: Dr. Wilber Zambrano phone [phone]\par            .\par            CC: Type 2 diabetes () 2016 A1c 7.2 %\par             -"trace retinopathy" will see 2018\par             Possible allergy to insulin\par             Early hypothyroidism - TSH 6.46 2016 -> 2.89 (3/2016; \par             TPO ab neg) \par             (CRI: creatinine 1.48, urine microalb - neg)\par             2017 fall: R rotator cuff and R wrist injury\par            ..\par            Using [employer]\par            .\par            Meds:\par            .\par            glipizide ER 2.5 mg in PM\par            Actos 15 mg every PM\par            JanuMet  BID\par             Cialis 5 mg prn\par             Amlodipine\par            .\par            Instructed with wife regarding Leonora.\par            Discussed reactive hypoglycemia after Oatmeal. \par            ROV one month. \par            .\par            ==\par            .\par            2018\par            .\par            PCP: Dr. Abdifatah Cutler at [facility] [phone]\par             Eyes: Dr. Tobias Pichardo 2015, 2017 p [phone]\par             Card: Dr. Alvarez for EST\par             GI: Dr. Zimmerman - 2014 - +FHx colon cancer age 61\par             Ortho: Dr. Cisneros #2 for knee surgery\par             -> Dr. Howard Red p \par             Nephro: Dr. Lino Ahn - since  for elevated creatinine\par             phone:  [place]\par             fax: [phone]\par             now Dr. Jarrell Frye \par             Feet: Dr. Carrillo in [facility] bunion surgery bilat\par             Chiropractor: Dr. Wilber Zambrano phone [phone]\par            .\par            CC: Type 2 diabetes () 2016 A1c 7.2 %\par             -"trace retinopathy"\par             Possible allergy to insulin\par             Early hypothyroidism - TSH 6.46 2016 -> 2.89 (3/2016; \par             TPO ab neg) \par             (CRI: creatinine 1.48, urine microalb - neg)\par             2017 fall: R rotator cuff and R wrist injury\par            .\par            2017 fell on black ice.\par            Today wearing sling R arm\par            Father 93 yo  past week in [facility].\par            .\par            Freestyle Leonora arrived last week, but he did not bring it in today. \par            .\par            Lab tests from last visit\par            2018 iincluded\par            A1c 6.9 %\par            TSH 2.61\par            creatinine 1.3 \par            microalbumin < 0.3 \par            .\par            Labs from\par            [facility] from\par            5/10/2018 included\par            glucosed 101 mg/dl\par            creatinine 1.55\par            A1c 7.2 % \par            .\par            Taking:\par             glipizide ER 2.5 mg in PM\par             Actos 15 mg every PM\par             JanuMet  BID\par             Cialis 5 mg prn\par             Amlodipin\par            .\par            Brings in records from \par            [facility] \par            .\par            ==\par            .\par            2018\par            .\par            PCP: Dr. Abdifatah Cutler at [facility] [phone]\par             Eyes: Dr. Tobias Pichardo 2015, 2017 p [phone]\par             Card: Dr. Alvarez for EST\par             GI: Dr. Zimmerman - 2014 - +FHx colon cancer age 61\par             Ortho: Dr. Cisneros #2 for knee surgery\par             -> Dr. Howard Red p \par             Nephro: Dr. Lino Ahn - since  for elevated creatinine\par             phone: [phone] [place]\par             fax: [phone]\par             Feet: Dr. Carrillo in [facility] bunion surgery bilat\par             Chiropractor: Dr. Wilber Zambrano phone [phone]\par            .\par            CC: Type 2 diabetes () 2016 A1c 7.2 %\par             Possible allergy to insulin\par             Early hypothyroidism - TSH 6.46 2016 -> 2.89 (3/2016; \par             TPO ab neg) \par             (CRI: creatinine 1.48, urine microalb - neg)\par            .\par            # Has been told of possible spinal stenosis\par            # Recent slip on ice with R hand ? ulna and\par             R shoulder rotator cuff injury and anticipates surgery. \par            # Diabetes\par             Labs from Sept included glucose 88\par             A1c 6. 2\par            . Will see optho tomorrow.\par             Meds: glipizide ER 2.5 mg in PM\par             Actos 15 mg every PM\par             JanuMet  BID\par             Cialis 5 mg prn\par             Amlodipine\par            .\par            Impression: He is doing well; however, a bit down over all of above.\par            .\par            Plan: Same Rx.\par            ROV 3-4 months. \par            .\par            .\par            ==\par            .\par            2017\par            .\par            PCP: Dr. Abdifatah Cutler at [facility] [phone]\par             Eyes: Dr. Tobias Pichardo 2015, 2017 p [phone]\par             Card: Dr. Alvarez for EST\par             GI: Dr. Zimmerman - 2014 - +FHx colon cancer age 61\par             Ortho: Dr. Cisneros #2 for knee surgery\par             -> Dr. Howard Red p \par             Nephro: Dr. Lino Ahn - since  for elevated creatinine\par             phone: [phone] [place]\par             fax: [phone]\par             Feet: Dr. Carrillo in [facility] bunion surgery bilat\par             Chiropractor: Dr. Wilber Zambrano phone [phone]\par            .\par            CC: Type 2 diabetes () 2016 A1c 7.2 %\par             Possible allergy to insulin\par             Early hypothyroidism - TSH 6.46 2016 -> 2.89 (3/2016; \par             TPO ab neg) \par             (CRI: creatinine 1.48, urine microalb - neg)\par            .\par            Low back has been bothering him and saw his chiropractor, Wilber Zambrano and had x-rays and manipulation. He would like to see pain management and consider accupuncture.\par            .\par            Returns for diabetes. \par            .\par             Last available labs are from \par            2017 and include\par            TSH 4.93\par            creatinine 1.27 \par            .\par            Eye exam in January by Dr. Hubert Lara showed trace retinopathy.\par            .\par            He is currently taking:\par            .\par            glipizide ER 2.5 mg in PM\par            Actos 15 mg every PM\par            JanuMet  BID\par            Cialis 5 mg prn\par            Amlodipine\par            .\par            Impression: Stable borderline elevation of TSH. Will repeat in future. Thyroid antibodies have been negative. He has not yet made it for ultrasound of thyroid.\par            .\par            No fingerstick BS today, but he reports sugars in good range. He will see ophthalmology in January and has been told of early retinopathy. \par            .\par            For decreaed GFR, he sees his kidney doctor regularly. He will see his primary care, Dr. Cutler, in the near future, for annual physical.\par            .\par            Plan: A1c, urine microalbumin today and ROV January. Thank you. -\par            .\par            .\par            ==\par            .\par            May 11,2017\par            .\par            PCP: Dr. Abdifatah Cutler at [facility] [phone]\par             Eyes: Dr. Tobias Pichardo 2015, 2017 p [phone]\par             Card: Dr. Alvarez for EST\par             GI: Dr. Zimmerman - 2014 - +FHx colon cancer age 61\par             Ortho: Dr. Cisneros #2 for knee surgery\par             -> Dr. Howard Red p \par             Nephro: Dr. Lino Ahn - since  for elevated creatinine\par             phone: [phone] [place]\par             fax: [phone]\par             Feet: Dr. Carrillo in [facility] bunion surgery bilat\par            .\par            CC: Type 2 diabetes () 2016 A1c 7.2 %\par             Possible allergy to insulin\par             Early hypothyroidism - TSH 6.46 2016 -> 2.89 (3/2016; \par             TPO ab neg) \par             (CRI: creatinine 1.48, urine microalb - neg)\par            .\par            , R TKR - Dr. Howard Nam at [facility]\par             Re\par            .\par            Meds include:\par            .\par            glipizide ER 2.5 mg in PM\par             Actos 15 mg every PM\par             JanuMet  BID\par             Cialis 5 mg prn\par             Amlodipine\par            .\par            [facility]\par            \par            TSH 4.93\par            creatinine 1.27\par            A1c 6.4 % Dr. Lino Ahn sent to Dr. Gregorio\par            .\par            ==\par            .\par            2017\par            .\par            PCP: Dr. Abdifatah Cutler at [facility] [phone]\par             Eyes: Dr. Tobias Pichardo 2015, 2017 p [phone]\par             Card: Dr. Alvarez for EST\par             GI: Dr. Zimmerman - 2014 - +FHx colon cancer age 61\par             Ortho: Dr. Cisneros #2 for knee surgery\par             -> Dr. Howard Red p \par             Nephro: Dr. Lino Ahn - since  for elevated creatinine\par             phone: [phone] [place]\par             fax: [phone]\par             Feet: Dr. Carrillo in [facility] bunion surgery bilat\par            .\par            CC: Type 2 diabetes () 2016 A1c 7.2 %\par             Possible allergy to insulin\par             Early hypothyroidism - TSH 6.46 2016 -> 2.89 (3/2016; \par             TPO ab neg) \par             (CRI: creatinine 1.48, urine microalb - neg)\par            .\par            Medications include:\par            .\par            glipizide ER 2.5 mg in PM\par             Actos 15 mg every PM\par             JanuMet  BID\par             Cialis 5 mg prn\par             Amlodipine\par            .\par            Today Mr. Ochoa kindly brought in lab tests from\par            2017 which included\par            PTH 53\par            TSH 3.35\par            HbA1c 7.5 %\par            urine microalbumin ratio 29 (0-30) \par            creatinine 1.4\par            \par            Hct 40 \par            .\par            His eye doctor, Dr. Collado, saw a small yellow spot. \par            .\par            His fingerstick BS show good FBS most of the time \par            .\par            Impression: He attributes A1c rise to stress of preparing taxes at this time of the year.\par            .\par            Plan: Same Rx plus stress reduction. \par            Diabetes under good control for R TKR at [facility]. \par            ROV 4 months. \par            .\par            ==\par            .\par            2016\par            .\par            PCP: Dr. Abdifatah Cutler at [facility] [phone]\par             Eyes: Dr. Tobias Pichardo 2015\par             Card: Dr. Alvarez for EST\par             GI: Dr. Zimmerman - 2014 - +FHx colon cancer age 61\par             Ortho: Dr. Cisneros #2 for knee surgery\par             -> Dr. Howard Red p \par             Nephro: Dr. Lino Ahn - since  for elevated creatinine\par             phone: [phone] [place]\par             fax: [phone]\par             Feet: Dr. Carrillo in [facility] - Hx bunion surgery bilat\par            .\par            CC: DM2 () 2016 A1c 7.2 %\par             Possible allergy to insulin\par             Early hypothyroidism - TSH 6.46 2016 -> 2.89 (3/2016; \par             TPO ab neg) \par             (CRI: creatinine 1.48, urine microalb - neg)\par            .\par            Told recent A1c 6.6 %\par            .\par            Will see Dr. Cutler in October \par            .\par            glipizide ER 2.5 mg in PM\par             Actos 15 mg every PM\par             JanuMet  BID\par             Cialis 5 mg prn\par             Amlodipine\par            .\par            Dr. Patel did blood end of August. \par            .\par            Impression: Decreased GFR - followed by Dr. Ahn.\par            Plan: Decrease the JanuMet  to once a day at next visit in January.\par            .\par            .\par            .\par            ==\par            .\par            Conchita 15, 2016\par            .\par            PCP: Dr. Abdifatah Cutler at [facility] [phone]\par             Eyes: Dr. Tobias Pichardo 2015\par             Card: Dr. Alvarez for EST\par             GI: Dr. Cisneros - 2014 - +FHx colon cancer age 61\par             Ortho: Dr. Cisneros #2 for knee surgery\par             -> Dr. Howard Red p \par             Nephro: Dr. Lino Ahn - since  for elevated creatinine\par             phone: [phone] [place]\par             fax: [phone]\par             Feet: Dr. Carrillo in [facility] - Hx bunion surgery bilat\par            .\par            CC: DM2 () 2016 A1c 7.2 %\par             Possible allergy to insulin\par             Early hypothyroidism - TSH 6.46 2016 -> 2.89 (3/2016; \par             TPO ab neg) \par             (CRI: creatinine 1.48, urine microalb - neg)\par            .\par            Required injection of steroids into R knee with some benefit.\par            Has had 5 knee surgeries in the past. \par            .\par            Diabetes medications remain:\par            .\par             glipizide ER 2.5 mg in PM\par             Actos 15 mg every PM\par             JanuMet  BID\par             Cialis 5 mg prn\par             Amlodipine\par            .\par            Self tests with [employer] and brings fingerstick BS.\par            FBS all normal. After dinner, as high as 205, 175 , 172, 279, 263, 207, 184 mg/dl.\par            .\par            Gets strips from [facility] in [place] on [address].\par            .\par            Impression: Walking less b/o R knee pain.\par            Had recent URI.\par            .\par            Plan: Same Rx. Review Meloxicam with Renal.\par            ROV 3 months. \par            .\par            .\par            .\par            ==\par            .\par            2016\par            .\par            PCP: Dr. Abdifatah Cutler at [facility] f [phone]\par             Eyes: Dr. Tobias Pichardo 2015\par             Card: Dr. Alvarez for EST\par             GI: Dr. Cisneros - 2014 - +FHx colon cancer age 61\par             Ortho: Dr. Cisneros #2 for knee surgery\par             Nephro: Dr. Lion Ahn - since  for elevated creatinine\par             phone: [phone] [place]\par             fax: [phone]\par             Feet: Dr. Carrillo in  [place] - Hx bunion surgery bilat\par            .\par            CC: DM2 ()\par             Possible allergy to insulin\par            .\par            Labs ordered by Dr. Lion Ahn at [facility] on 2016 are kindly provided by Mr. Baldwines and include:\par            .\par            25 OH vit D 29\par             mg/dl (109 in Nov)\par            creatinine 1.48 *** (1.43 in Nov)\par            uric acid 5.9\par            T4 7.1\par            TSH 6.46 *** (2.82 in Nov) \par            HbA1c 7.2 % (7.1 % in Nov)\par            PTH 71 (11-67)\par            .\par            Medications include:\par            .\par             glipizide ER 2.5 mg in PM\par             Actos 15 mg every PM\par             JanuMet  BID\par             Cialis 5 mg prn\par             Amlodipine 5 mg daily and that\par            Testing with [employer] meter. \par            He notes that "CranRaisins" in hiss Tossed tuna salad/cheese cause BS to go from  mg/dl \par            But Oxtail and Brown Rice/salad caused BS 85 to go to 198\par            .\par            Impression: \par            # Pre-hypothyroid Plan: repeat TSH, abs, ultrasound\par            # Diabetes under very good control. Probable insulin allergy.\par             May need to stop the glipizide.\par            ROV 3 1/2 months. \par            .\par            ==\par            .\par            Dec 9, 2015\par            .\par            PCP: Dr. Abdifatah Cutler at [facility] [phone]\par             Eyes: Dr. Tobias Pichardo 2015\par             Card: Dr. Alvarez for EST\par             GI: Dr. Cisneros - 2014 - +FHx colon cancer age 61\par             Ortho: Dr. Cisneros #2 for knee surgery\par             Nephro: Dr. Lino Ahn - since  for elevated creatinine\par             phone: [phone] [place]\par             fax: [phone]\par             Feet: Dr. Carrillo in [facility] - Hx bunion surgery bilat\par            .\par            CC: DM2 ()\par             Possible allergy to insulin\par            .\par            Note from August appended below.\par            Has URI with cough. => elev sugar\par            Mr. Ochoa kindly brings in report from Dr. Lino Ahn at [facility] which includes lab studies:\par             mg/dl\par            BUN 15\par            creatinine 1.43 **\par             ()\par            HbA1c 7.1% \par            TSH 2.82\par            .\par            Medications inlcude:\par            .\par            glipizide ER 2.5 mg in PM\par            Actos 15 mg every PM\par            Metformin 1000 mg BID -> JanuMet  BID\par            Cialis 5 mg daily\par            Amlodipine 5 mg daily and that\par            .\par            Impression: Doing well. He enjoys a fair amount of starch.\par            .\par            Plan: Test AC and PC\par            ROV 4 months. -jh\par            .\par            .\par            .\par            ==\par            .\par            2015\par            .\par            PCP: Dr. Abdifatah Cutler at [facility] [phone]\par             Eyes: Dr. Tobias Pichardo 2015\par             Card: Dr. Alvarez for EST\par             GI: Dr. Cisneros - 2014 - +FHx colon cancer age 61\par             Ortho: Dr. Cisneros #2 for knee surgery\par             Nephro: Dr. 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 Lino Ahn. Dr. Ahn reports patient is newly taking \par            Valsartan 320 mg daily and remains on\par            Vitamin D 1000 units daily\par            Atorvastatin 10 mg daily\par            Actos 15 mg every PM\par            Metformin 1000 mg BID -> JanuMet  BID\par            Cialis 5 mg daily\par            Amlodipine 5 mg daily and that\par            Diovan was discontinued. \par            .\par            He also takes glipizide ER 2.5 mg in PM\par            .\par            Also included are blood tests which show:\par            glucose 104 mg/dl\par            BUN 13\par            creatinine 1.4\par            potassium 4.7\par            T4 6.5\par            TSH 3.54\par            PTH 75.5 (<67)\par            HbA1c 6.4% *********** (down from 7.7% in 2013)\par            .\par            Impression: Left his fingerstick BS at home. But he reports that FBS around  and when he checks 2 hours after meals BS usually up to 140, but as high as 175 mg/dl if has pancakes. In sum, doing very well.\par            .\par            Plan: Same diabetic Rx. Bring fingerstick BS to next visit.\par            .\par            ==\par            .\par            May 12, 2015\par            .\par            PCP: Dr. Abdifatah Cutler at [facility] [phone]\par             Eyes: Dr. Tobias Pichardo 2015\par             Card: Dr. Alvarez for EST\par             GI: Dr. Cisneros - 2014 - +FHx colon cancer age 61\par             Ortho: Dr. Cisneros #2 for knee surgery\par             Nephro: Dr. Lino Ahn - since  for elevated creatinine\par             phone: [phone] [place]\par             fax: [phone]\par             .\par            . \par            CC: DM2 ()\par             Possible allergy to insulin\par            .\par            Labs kindly provided by Dr. Ahn show\par             m/dl\par            creatinine 1.42\par            TSH 3.41\par            HgbA1c 7.1%\par            . \par            Diabetes meds:\par            .\par            glipiizide ER 2.5 mg in PM\par            metformin 1000 mg BID\par            Actos 15 mg in AM\par            .\par            Impression. Doing well. \par            May have 10:30 am reactive hypoglycemia (vs glipizide effect).\par            Plan: add Januvia 50 mg in AM as JanuMet.\par            .\par            ROV 4 months.\par            .\par            =======\par            Nov 10, 2014\par            PCP: Dr. Jefry Albright ([facility])\par             Eyes: Dr. Tobias Pichardo\par             Card: Dr. Alvarez for EST\par             GI: Dr. Cisneros - 2014 - +FHx colon cancer age 61\par             Nephro: Dr. Ahn - since  for elevated creatinine\par            . \par            CC: DM2 ()\par             Possible allergy to insulin\par            .\par            Current medicatilns for diabetes:\par            .\par            glipiizide ER 2.5 mg in PM\par            metformin 1000 mg BID\par            Actos 15 mg in AM\par            .\par            Denies any recent hypoglycemia. Lowest ever was 79 mg/dl and felt OK. \par            .\par            .\par            Labs from Dr. Lino Ahn Sept 22 at [facility] in [place] (pone ) include\par            HbA1c 6.5% ***\par            Hct 45.6\par            HDL 47\par            LDL 51\par            BS 90\par            creatinine 1.38\par            BUN 16\par            potassium 4.9 \par            25 OH vit D 29 \par            .\par            Impression: Doing very well. Dr. Quintanilla has been monitoring his blood pressure and encouring walking. Wife got him FitBit and walked on [address] at [place]. \par            .\par            Plan: Same Rx and ROV six months. \par            ==\par            May 12, 2014\par            PCP: Dr. Jefry Albright\par            CC: DM2 (since ), possible allergy to insulin.\par             Recent R knee pain.\par            .\par            Meications include:\par            .\par            metformin 1000 mg BID\par            Actos 15 mg in AM.\par            glipizide ER \par            .\par            2013 lab tests show\par            FBS 93, urine microalbumin normal, \par            C-peptide 2.7\par            TSH 2.52\par            HbA1c 6.4%\par            .\par            [facility]  labs from Dr. Lino Fernández at [facility] phone \par            HbA1c 6.5%\par            .\par            Today he brings computer printout of sugars and diet diary.\par            R knee injection for pain - he noted sugars went up then. \par            .\par            Also received steroid spray for coughing when exposed to cold. \par            Impression: Keeps excellent records.\par            Sugars AC are all fine.\par            PC generally good, even with Glucerna. \par            Plan: Nothing to add or change at the moment as he is doing so well.\par            U/A need to be monitored b/o Actos at present. \par            .\par            ======\par            .\par            2013\par            PCP: Dr. Jefry Albright Retina: Dr. Iker Pichardo\par            CC: DM2 (), ? insulin allergy\par            .\par            Off Bydurion.\par            On metformin 1000 mg BID\par            Actos 15 mg every AM\par            .\par            Feels Actos has helped.\par            .\par            =====\par            2013\par            PCP: Dr. Jefry Albright\par            CC: DM2 ? insulin allergy\par            .\par            Remains on \par            Bydurian weekly\par            metformin 1000 mg BID\par            .\par            Brought in his records.\par            All before meal BS excellent.\par            After meals may go into 180's max.\par            Even though still has bagels or oatmeal, post meal BS somewhat better, fructosamine WNL.\par            Dr. Albright did A1c recently, not yet available. \par            .\par            He said that BC Nurse originally advised the Bydurion (!) but he is thinking of trying something else. Took Actos in the past, but stopped because of fears.\par            Plan: Actos 15 mg every AM; continue the metformin 1000 mg BID.\par            He is welcome to stay on the Bydurion if he likes and is well aware of pancreatitis and pancreatic cancer issues.\par            .\par            Dr. Collado in [place] is his eye doctor. Given good report. \par            Podiatrist - Dr. Carrillo - saw himn last week. \par            GI: Colonoscopy at the end of the end of the month. \par            May 14, 2013\par            PCP: Dr. Jefry Albright\par            CC: DM2 ? insulin allergy\par            .\par            Last note appended below.\par            Remains on metformin 1000 mg BID and\par            Bydurion weekly on Sundays.\par            .\par            Although he brought careful records, he tells me that the FBS is around 110 and up to 271 after instant oatmeal. \par            .\par            He has modified his diet based on that. \par            +++\par            April 15, 2013\par            CC: DM2\par            .\par            First visit for 58 yo who works at [employer] in [place], accompanied by his wife, for DM2.\par            Presented  with "polys, weight loss, ketosis" and hospitalized at [facility] for a week. Recalls treatment with insulin and eventually Actos. ?Insulin allergy.\par            .\par            Currently on metformin 1000 mg BID and colin been on\par            Bydurion for about 3 months.\par            .\par            Blood tests from  show  mg/dl, A1c 7.7%. \par            microalbumin 61 \par            .\par            Impression: Tests fingerstick BS about 14 X a week w/o food diary.\par            Wants to know what medication will make A1c better.\par

## 2020-05-21 NOTE — ASSESSMENT
[FreeTextEntry1] : Diabetes under good control based on Freestyle Leonora 14.\par Rare, mild, hypoglycemia.\par He will go for updated labs at [facility] in near future.\par ROV September.

## 2020-10-09 ENCOUNTER — APPOINTMENT (OUTPATIENT)
Dept: ENDOCRINOLOGY | Facility: CLINIC | Age: 65
End: 2020-10-09
Payer: COMMERCIAL

## 2020-10-09 DIAGNOSIS — E03.9 HYPOTHYROIDISM, UNSPECIFIED: ICD-10-CM

## 2020-10-09 PROCEDURE — 99214 OFFICE O/P EST MOD 30 MIN: CPT | Mod: 95

## 2020-10-09 RX ORDER — PIOGLITAZONE HYDROCHLORIDE 15 MG/1
15 TABLET ORAL DAILY
Qty: 90 | Refills: 3 | Status: DISCONTINUED | COMMUNITY
End: 2020-10-09

## 2020-10-09 NOTE — HISTORY OF PRESENT ILLNESS
[FreeTextEntry1] : Oct 09, 2020   Videochat  iPhone \par \par PCP: Dr. Abdifatah Cutler at [facility] [phone]\par             Eyes: Dr. Tobias Pichardo 2015, 2017 p [phone]\par             Card: Dr. Alvarez for EST (retired)\par             GI: Dr. Zimmerman - 2014 - +FHx colon cancer age 61\par             Ortho: Dr. Cisneros #2 for knee surgery\par             -> Dr. Howard Red p \par             Nephro: Dr. Lino Ahn - since  for elevated creatinine - retired\par             phone: [phone] [place]  fax: [phone]  now Dr. Jarrell Frye \par             Feet: Dr. Carrillo in  [place] - Hx bunion surgery bilateral\par             Chiropractor: Dr. 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 Cutler had him tested for Corona b/o cough....still has \par \par Still has cough.\par Went to [facility] for labs.    \par 2020 included\par A1c  6.8 %\par creatinine 1.53    ***will f/u to nephrology\par \par \par Medications include:\par \par Remains on\par  glipizide ER 2.5 in PM\par pioglitazone 15 mg in PM - stopped\par JanuMet  BID\par amlodipine\par Cialis 5 mg prn\par \par Using Freestyle Leonora with benefit.\par \par \par \par May 21, 2020   VideoChat  Facetime \par \par PCP: Dr. Abdifatah Cutler at [facility] [phone]\par             Eyes: Dr. Tobias Pichardo 2015, 2017 p [phone]\par             Card: Dr. Alvarez for EST (retired)\par             GI: Dr. Zimmerman - 2014 - +FHx colon cancer age 61\par             Ortho: Dr. Cisneros #2 for knee surgery\par             -> Dr. Howard Red p \par             Nephro: Dr. Lino Ahn - since  for elevated creatinine - retired\par             phone: [phone] [place]  fax: [phone]  now Dr. Jarrell Frye \par             Feet: Dr. Carrillo in  [place] - Hx bunion surgery bilateral\par             Chiropractor: Dr. 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 Cutler had him tested for Corona b/o cough....still has it.\par Had recent eye examby Dr. Lara - given good report.  \par \par Gets Leonora from [facility]\par \par \par \par Dec 20, 2019\par \par PCP: Dr. Abdifatah Cutler at [facility] [phone]\par             Eyes: Dr. Tobias Pichardo 2015, 2017 p [phone]\par             Card: Dr. Alvarez for EST (retired)\par             GI: Dr. Zimmerman - 2019 - +FHx colon cancer age 61\par             Ortho: Dr. Cisneros #2 for knee surgery\par             -> Dr. Howard Red p \par             Nephro: Dr. Lino Ahn - since  for elevated creatinine - retired\par             phone: [phone] [facility]  fax: [phone]  now Dr. Jarrell Frye \par             Feet: Dr. Carrillo in . [place] - Hx bunion surgery bilateral\par             Chiropractor: Dr. 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 Howard Frye \par 2019 included\par A1c 6.7 %\par TSH 3.0\par vit D 72 **\par creatinine 1.63 **\par \par Remains on glipizide ER 2.5 in PM\par pioglitazone 15 mg in PM\par JanuMet  BID\par amlodipine\par Cialis 5 mg prn\par \par Impression:   TSH has been slightly elevated in the past, now back in normal range.\par Using Leonora at $60/2\par \par Plan:  Same Rx.\par \par ROV in April or May.  \par \par \par \par \par \par 2019\par \par  PCP: Dr. Abdifatah Cutler at [facility] [phone]\par             Eyes: Dr. Tobias Pichardo 2015, 2017 p [phone]\par             Card: Dr. Alvarez for EST (retired)\par             GI: Dr. Zimmerman - 2014 - +FHx colon cancer age 61\par             Ortho: Dr. Cisneros #2 for knee surgery\par             -> Dr. Howard Red p \par             Nephro: Dr. Lino Ahn - since  for elevated creatinine - retired\par             phone: [phone] [facility]  fax: [phone]  now Dr. Jarrell Frye \par             Feet: Dr. Carrillo in [facility] - Hx bunion surgery bilateral\par             Chiropractor: Dr. 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 in [place]  last 2019\par             Possible allergy to insulin\par             Early hypothyroidism - TSH 6.46 2016 -> 2.89 (3/2016; \par             TPO ab neg) \par             (CRI: creatinine 1.48, urine microalbumin - negative)\par             2017 fall: R rotator cuff and R wrist injury\par            .\par Last here in 2019 and switched from Leonora "10" to Leonora 14.\par Labs from January included creatinine 1.45, glucose 145 and A1c 7.1 %\par 2018 TSH 4.57 (on no Rx)\par \par Medications include:\par  glipizide ER 2.5 mg in PM           *****\par   pioglitazone 15 mg every PM\par  JanuMet  BID                *****\par   Cialis 5 mg prn\par   Amlodipine\par \par \par \par Brings in original Leonora [place] and given coupon for Leonora 14   \par Being followed at [facility] in [place] Fax [phone] - Dr. Jarrell Frye \par Recent [id number] 1.62 \par 25 OH vit D 37\par B12 344 \par uric acid 7.4\par Mg 1.6\par TSH 4.57 ***  (stable, abs neg)\par \par For diabetes, taking:\par \par    glipizide ER 2.5 mg in PM\par            pioglitazone 15 mg every PM\par            JanuMet  BID\par             Cialis 5 mg prn\par             Amlodipine\par \par He was given good report by his ophthalmologist. \par \par Plan:  . A1c today.  \par \par \par \par Previous notes from [employer] appended below.\par \par  2018\par            .\par            PCP: Dr. Abdifatah Cutler at [facility] [phone]\par             Eyes: Dr. Tobias Pichardo 2015, 2017 p [phone]\par             Card: Dr. Alvarez for EST\par             GI: Dr. Zimmerman - 2014 - +FHx colon cancer age 61\par             Ortho: Dr. Cisneros #2 for knee surgery\par             -> Dr. Howard Red p \par             Nephro: Dr. Lino Ahn - since  for elevated creatinine\par             phone: [phone] [place]\par             fax: [phone]\par             now Dr. Jarrell Frye \par             Feet: Dr. Carrillo in [facility] -  bunion surgery bilat\par             Chiropractor: Dr. Wilber Zambrano phone [phone]\par            .\par            CC: Type 2 diabetes () 2016 A1c 7.2 %\par             -"trace retinopathy" will see 2018\par             Possible allergy to insulin\par             Early hypothyroidism - TSH 6.46 2016 -> 2.89 (3/2016; \par             TPO ab neg) \par             (CRI: creatinine 1.48, urine microalb - neg)\par             2017 fall: R rotator cuff and R wrist injury\par            .\par            Reviewed Leonora.\par            Spikes after [employer] [place] [employer]. from [employer] in [place]\par            .\par            glipizide ER 2.5 mg in PM\par            Actos 15 mg every PM\par            JanuMet  BID\par             Cialis 5 mg prn\par             Amlodipine\par            .\par            Eyes checked by Dr. Collado in 2018 and again 2019 \par            .\par            ==\par            .\par            2018\par            .\par            PCP: Dr. Abdifatah Cutler at [facility] [phone]\par             Eyes: Dr. Tobias Pichardo 2015, 2017 p [phone]\par             Card: Dr. Alvarez for EST\par             GI: Dr. Zimmerman - 2014 - +FHx colon cancer age 61\par             Ortho: Dr. Cisneros #2 for knee surgery\par             -> Dr. Howard Red p \par             Nephro: Dr. Lino Ahn - since  for elevated creatinine\par             phone:  [place]\par             fax: [phone]\par             now Dr. Jarrell Frye to see 2018\par             Feet: Dr. Carrillo in [facility] bunion surgery bilat\par             Chiropractor: Dr. Wilber Zambrano phone [phone]\par            .\par            CC: Type 2 diabetes () 2016 A1c 7.2 %\par             -"trace retinopathy" will see 2018\par             Possible allergy to insulin\par             Early hypothyroidism - TSH 6.46 2016 -> 2.89 (3/2016; \par             TPO ab neg) \par             (CRI: creatinine 1.48, urine microalb - neg)\par             2017 fall: R rotator cuff and R wrist injury\par            ..\par            Using [employer]\par            .\par            Meds:\par            .\par            glipizide ER 2.5 mg in PM\par            Actos 15 mg every PM\par            JanuMet  BID\par             Cialis 5 mg prn\par             Amlodipine\par            .\par            Instructed with wife regarding Leonora.\par            Discussed reactive hypoglycemia after Oatmeal. \par            ROV one month. \par            .\par            ==\par            .\par            2018\par            .\par            PCP: Dr. Abdifatah Cutler at [facility] [phone]\par             Eyes: Dr. Tobias Pichardo 2015, 2017 p [phone]\par             Card: Dr. Alvarez for EST\par             GI: Dr. Zimmerman - 2014 - +FHx colon cancer age 61\par             Ortho: Dr. Cisneros #2 for knee surgery\par             -> Dr. Howard Red p \par             Nephro: Dr. Lino Ahn - since  for elevated creatinine\par             phone: [phone] [place]\par             fax: [phone]\par             now Dr. Jarrell Frye \par             Feet: Dr. Carrillo in [facility] bunion surgery bilat\par             Chiropractor: Dr. Wilber Zambrano phone [phone]\par            .\par            CC: Type 2 diabetes () 2016 A1c 7.2 %\par             -"trace retinopathy"\par             Possible allergy to insulin\par             Early hypothyroidism - TSH 6.46 2016 -> 2.89 (3/2016; \par             TPO ab neg) \par             (CRI: creatinine 1.48, urine microalb - neg)\par             2017 fall: R rotator cuff and R wrist injury\par            .\par            2017 fell on black ice.\par            Today wearing sling R arm\par            Father 93 yo  past week in [facility].\par            .\par            Freestyle Leonora arrived last week, but he did not bring it in today. \par            .\par            Lab tests from last visit\par            2018 iincluded\par            A1c 6.9 %\par            TSH 2.61\par            creatinine 1.3 \par            microalbumin < 0.3 \par            .\par            Labs from\par            [facility] from\par            5/10/2018 included\par            glucosed 101 mg/dl\par            creatinine 1.55\par            A1c 7.2 % \par            .\par            Taking:\par             glipizide ER 2.5 mg in PM\par             Actos 15 mg every PM\par             JanuMet  BID\par             Cialis 5 mg prn\par             Amlodipin\par            .\par            Brings in records from \par            [employer] [facility] \par            .\par            ==\par

## 2020-10-09 NOTE — ASSESSMENT
[FreeTextEntry1] : Creatinine 1.59\par A1c improved.\par Seeing ophthalmology soon.\par Will renew glipizide ER 2.5 i nPM and Janumet  BID and\par sensors for Leonora 14

## 2021-02-12 ENCOUNTER — APPOINTMENT (OUTPATIENT)
Dept: ENDOCRINOLOGY | Facility: CLINIC | Age: 66
End: 2021-02-12
Payer: COMMERCIAL

## 2021-02-12 PROCEDURE — 99072 ADDL SUPL MATRL&STAF TM PHE: CPT

## 2021-02-12 PROCEDURE — 99214 OFFICE O/P EST MOD 30 MIN: CPT

## 2021-02-12 NOTE — HISTORY OF PRESENT ILLNESS
[FreeTextEntry1] : 2021     iPhone\par \par PCP: Dr. Abdifatah Cutler at [facility] [phone]\par             Eyes: Dr. Tobias Pichardo 2015, 2017 p [phone]\par             Card: Dr. Alvarez for EST (retired)\par             GI: Dr. Zimmerman - 2014 - +FHx colon cancer age 61\par             Ortho: Dr. Cisneros #2 for knee surgery\par             -> Dr. Howard Red p \par             Nephro: Dr. Lion Ahn - since  for elevated creatinine - retired\par             phone: [phone] [place]  fax: [phone]  now Dr. Jarrell Frye \par             Feet: Dr. Carrillo in [facility] - Hx bunion surgery bilateral\par             Chiropractor: Dr. Wilber Zambrano phone [phone]\par            .\par            CC: Type 2 diabetes () 2016 A1c 7.2 %                  \par             -"trace retinopathy" - Dr. Lara in [place]  last 2019\par             Possible allergy to insulin\par             Early hypothyroidism - TSH 6.46 2016 -> 2.89 (3/2016; \par             TPO ab neg) \par             (CRI: creatinine 1.48, urine microalbumin - negative)\par             2017 fall: R rotator cuff and R wrist injury\par            .\par # A1c up to 7%\par \par #  TSH 5.09   not on medication\par \par \par \par Oct 09, 2020   Videochat  iPhone \par \par PCP: Dr. Abdifatah Cutler at [facility] [phone]\par             Eyes: Dr. Tobias Pichardo 2015, 2017 p [phone]\par             Card: Dr. Avlarez for EST (retired)\par             GI: Dr. Zimmerman - 2014 - +FHx colon cancer age 61\par             Ortho: Dr. Cisneros #2 for knee surgery\par             -> Dr. Howard Red p \par             Nephro: Dr. Lino Ahn - since  for elevated creatinine - retired\par             phone: [phone] [place]  fax: [phone]  now Dr. Jarrell Frye \par             Feet: Dr. Carrillo in  [place] - Hx bunion surgery bilateral\par             Chiropractor: Dr. Wilber Zambrano phone [phone]\par            .\par            CC: Type 2 diabetes () 2016 A1c 7.2 %\par             -"trace retinopathy" - Dr. Lara in [place]  last 2019\par             Possible allergy to insulin\par             Early hypothyroidism - TSH 6.46 2016 -> 2.89 (3/2016; \par             TPO ab neg) \par             (CRI: creatinine 1.48, urine microalbumin - negative)\par             2017 fall: R rotator cuff and R wrist injury\par            .\par Dr. Cutler had him tested for Corona b/o cough....still has \par \par Still has cough.\par Went to [facility] for labs.    \par 2020 included\par A1c  6.8 %\par creatinine 1.53    ***will f/u to nephrology\par \par \par Medications include:\par \par Remains on\par  glipizide ER 2.5 in PM\par pioglitazone 15 mg in PM - stopped\par JanuMet  BID\par amlodipine\par Cialis 5 mg prn\par \par Using Freestyle Leonora with benefit.\par \par \par \par May 21, 2020   VideoChat  [facility] by [facility] \par \par PCP: Dr. Abdifatah Cutler at [facility] [phone]\par             Eyes: Dr. Tobias Pichardo 2015, 2017 p [phone]\par             Card: Dr. Alvarez for EST (retired)\par             GI: Dr. Zimmerman - 2014 - +FHx colon cancer age 61\par             Ortho: Dr. Cisneros #2 for knee surgery\par             -> Dr. Howard Red p \par             Nephro: Dr. Lino Ahn - since  for elevated creatinine - retired\par             phone: [phone] [place]  fax: [phone]  now Dr. Jarrell Frye \par             Feet: Dr. Carrillo in [facility] bunion surgery bilateral\par             Chiropractor: Dr. Wilber Zambrano phone [phone]\par            .\par            CC: Type 2 diabetes () 2016 A1c 7.2 %\par             -"trace retinopathy" - Dr. Lara in [place]  last 2019\par             Possible allergy to insulin\par             Early hypothyroidism - TSH 6.46 2016 -> 2.89 (3/2016; \par             TPO ab neg) \par             (CRI: creatinine 1.48, urine microalbumin - negative)\par             2017 fall: R rotator cuff and R wrist injury\par            .\par Dr. Cutler had him tested for Corona b/o cough....still has it.\par Had recent eye examby Dr. Lara - given good report.  \par \par [facility]\par \par \par \par Dec 20, 2019\par \par PCP: Dr. Abdifatah Cutler at [facility] [phone]\par             Eyes: Dr. Tobias Pichardo 2015, 2017 p [phone]\par             Card: Dr. Alvarez for EST (retired)\par             GI: Dr. Zimmerman - 2019 - +FHx colon cancer age 61\par             Ortho: Dr. Cisneros #2 for knee surgery\par             -> Dr. Howard Red p \par             Nephro: Dr. Lino Ahn - since  for elevated creatinine - retired\par             phone: [phone] [place]  fax: [phone]  now Dr. Jarrell Frye \par             Feet: Dr. Carrillo in [facility] bunion surgery bilateral\par             Chiropractor: Dr. Wilber Zambrano phone [phone]\par            .\par            CC: Type 2 diabetes () 2016 A1c 7.2 %\par             -"trace retinopathy" - Dr. Lara in [place]  last 2019\par             Possible allergy to insulin\par             Early hypothyroidism - TSH 6.46 2016 -> 2.89 (3/2016; \par             TPO ab neg) \par             (CRI: creatinine 1.48, urine microalbumin - negative)\par             2017 fall: R rotator cuff and R wrist injury\par            .\par  A1c 7.2 %\par Recent labs from [facility] - Dr. Howard Frye \par 2019 included\par A1c 6.7 %\par TSH 3.0\par vit D 72 **\par creatinine 1.63 **\par \par Remains on glipizide ER 2.5 in PM\par pioglitazone 15 mg in PM\par JanuMet  BID\par amlodipine\par Cialis 5 mg prn\par \par Impression:   TSH has been slightly elevated in the past, now back in normal range.\par Using Leonora at $60/2\par \par Plan:  Same Rx.\par \par ROV in April or May.  \par \par \par \par \par \par 2019\par \par  PCP: Dr. Abdifatah Cutler at [facility] [phone]\par             Eyes: Dr. [place] Pichardo 2015, 2017 p [phone]\par             Card: Dr. Alvarez for EST (retired)\par             GI: Dr. Zimmerman - 2014 - +FHx colon cancer age 61\par             Ortho: Dr. Cisneros #2 for knee surgery\par             -> Dr. Howard Red p \par             Nephro: Dr. Lino Ahn - since  for elevated creatinine - retired\par             phone: [phone] [place]  fax: [phone]  now Dr. Jarrell Frye \par             Feet: Dr. Carrillo in [facility] - Hx bunion surgery bilateral\par             Chiropractor: Dr. Wilber Zambrano phone [phone]\par            .\par            CC: Type 2 diabetes () 2016 A1c 7.2 %\par             -"trace retinopathy" - Dr. Lara in [place]  last 2019\par             Possible allergy to insulin\par             Early hypothyroidism - TSH 6.46 2016 -> 2.89 (3/2016; \par             TPO ab neg) \par             (CRI: creatinine 1.48, urine microalbumin - negative)\par             2017 fall: R rotator cuff and R wrist injury\par            .\par Last here in 2019 and switched from Leonora "10" to Leonora 14.\par Labs from January included creatinine 1.45, glucose 145 and A1c 7.1 %\par 2018 TSH 4.57 (on no Rx)\par \par Medications include:\par  glipizide ER 2.5 mg in PM           *****\par   pioglitazone 15 mg every PM\par  JanuMet  BID                *****\par   Cialis 5 mg prn\par   Amlodipine\par \par \par \par Brings in original Leonora [place] and given coupon for Leonora 14   \par Being followed at [facility] in [place] Fax [phone] - Dr. Jarrell Frye \par Recent [id number] 1.62 \par 25 OH vit D 37\par B12 344 \par uric acid 7.4\par Mg 1.6\par TSH 4.57 ***  (stable, abs neg)\par \par For diabetes, taking:\par \par    glipizide ER 2.5 mg in PM\par            pioglitazone 15 mg every PM\par            JanuMet  BID\par             Cialis 5 mg prn\par             Amlodipine\par \par He was given good report by his ophthalmologist. \par \par Plan:  . A1c today.  \par \par \par \par Previous notes from [facility] appended below.\par \par  2018\par            .\par            PCP: Dr. Abdifatah Cutler at [facility] [phone]\par             Eyes: Dr. Tobias Pichardo 2015, 2017 p [phone]\par             Card: Dr. Alvarez for EST\par             GI: Dr. Zimmerman - 2014 - +FHx colon cancer age 61\par             Ortho: Dr. Cisneros #2 for knee surgery\par             -> Dr. Howard Red p \par             Nephro: Dr. Lino Ahn - since  for elevated creatinine\par             phone: [phone] [place]\par             fax: [phone]\par             now Dr. Jarrell Frye \par             Feet: Dr. Carrillo in [facility] - Hx bunion surgery bilat\par             Chiropractor: Dr. Wilber Zambrano phone [phone]\par            .\par            CC: Type 2 diabetes () 2016 A1c 7.2 %\par             -"trace retinopathy" will see 2018\par             Possible allergy to insulin\par             Early hypothyroidism - TSH 6.46 2016 -> 2.89 (3/2016; \par             TPO ab neg) \par             (CRI: creatinine 1.48, urine microalb - neg)\par             2017 fall: R rotator cuff and R wrist injury\par            .\par            Reviewed Leonora.\par            Spikes after [employer] [place] [employer]. from [employer] in [place]\par            .\par            glipizide ER 2.5 mg in PM\par            Actos 15 mg every PM\par            JanuMet  BID\par             Cialis 5 mg prn\par             Amlodipine\par            .\par            Eyes checked by Dr. Collado in 2018 and again 2019 \par            .\par            ==\par            .\par            2018\par            .\par            PCP: Dr. Abdifatah Cutler at [facility] [phone]\par             Eyes: Dr. Tobias Pichardo 2015, 2017 p [phone]\par             Card: Dr. Alvarez for EST\par             GI: Dr. Zimmerman - 2014 - +FHx colon cancer age 61\par             Ortho: Dr. Cisneros #2 for knee surgery\par             -> Dr. Howard Red p \par             Nephro: Dr. Lino Ahn - since  for elevated creatinine\par             phone: [phone] [place]\par             fax: [phone]\par             now Dr. Jarrell Frye to see 2018\par             Feet: Dr. Carrillo in [facility] -  bunion surgery bilat\par             Chiropractor: Dr. Wilber Zambrano phone [phone]\par            .\par            CC: Type 2 diabetes () 2016 A1c 7.2 %\par             -"trace retinopathy" will see 2018\par             Possible allergy to insulin\par             Early hypothyroidism - TSH 6.46 2016 -> 2.89 (3/2016; \par             TPO ab neg) \par             (CRI: creatinine 1.48, urine microalb - neg)\par             2017 fall: R rotator cuff and R wrist injury\par            ..\par            Using [facility]\par            .\par            Meds:\par            .\par            glipizide ER 2.5 mg in PM\par            Actos 15 mg every PM\par            JanuMet  BID\par             Cialis 5 mg prn\par             Amlodipine\par            .\par            Instructed with wife regarding Leonora.\par            Discussed reactive hypoglycemia after Oatmeal. \par            ROV one month. \par            .\par            ==\par            .\par            2018\par            .\par            PCP: Dr. Abdifatah Cutler at [facility] [phone]\par             Eyes: Dr. Tobias Pichardo 2015, 2017 p [phone]\par             Card: Dr. Alvarez for EST\par             GI: Dr. Zimmerman - 2014 - +FHx colon cancer age 61\par             Ortho: Dr. Cisneros #2 for knee surgery\par             -> Dr. Howard Red p \par             Nephro: Dr. Lino Ahn - since  for elevated creatinine\par             phone: [phone] [place]\par             fax: [phone]\par             now Dr. Jarrell Frye \par             Feet: Dr. Carrillo in [facility] -  bunion surgery bilat\par             Chiropractor: Dr. Wilber Zambrano phone [phone]\par            .\par            CC: Type 2 diabetes () 2016 A1c 7.2 %\par             -"trace retinopathy"\par             Possible allergy to insulin\par             Early hypothyroidism - TSH 6.46 2016 -> 2.89 (3/2016; \par             TPO ab neg) \par             (CRI: creatinine 1.48, urine microalb - neg)\par             2017 fall: R rotator cuff and R wrist injury\par            .\par            2017 fell on black ice.\par            Today wearing sling R arm\par            Father 95 yo  past week in [facility].\par            .\par            Freestyle Leonora arrived last week, but he did not bring it in today. \par            .\par            Lab tests from last visit\par            2018 iincluded\par            A1c 6.9 %\par            TSH 2.61\par            creatinine 1.3 \par            microalbumin < 0.3 \par            .\par            Labs from\par            [facility] from\par            5/10/2018 included\par            glucosed 101 mg/dl\par            creatinine 1.55\par            A1c 7.2 % \par            .\par            Taking:\par             glipizide ER 2.5 mg in PM\par             Actos 15 mg every PM\par             JanuMet  BID\par             Cialis 5 mg prn\par             Amlodipin\par            .\par            Brings in records from \par            [facility] \par            .\par            ==\par

## 2021-02-12 NOTE — ASSESSMENT
[FreeTextEntry1] : Doing well.\par A1c drifted up to 7\par Will be following up to Nephrology for elevated creatinine.\par ROV 4 months

## 2021-06-04 ENCOUNTER — APPOINTMENT (OUTPATIENT)
Dept: ENDOCRINOLOGY | Facility: CLINIC | Age: 66
End: 2021-06-04
Payer: COMMERCIAL

## 2021-06-04 ENCOUNTER — NON-APPOINTMENT (OUTPATIENT)
Age: 66
End: 2021-06-04

## 2021-06-04 VITALS
WEIGHT: 235 LBS | DIASTOLIC BLOOD PRESSURE: 78 MMHG | HEART RATE: 84 BPM | SYSTOLIC BLOOD PRESSURE: 130 MMHG | OXYGEN SATURATION: 98 % | BODY MASS INDEX: 33.64 KG/M2 | HEIGHT: 70 IN

## 2021-06-04 PROCEDURE — 99072 ADDL SUPL MATRL&STAF TM PHE: CPT

## 2021-06-04 PROCEDURE — 99214 OFFICE O/P EST MOD 30 MIN: CPT

## 2021-06-04 NOTE — HISTORY OF PRESENT ILLNESS
[FreeTextEntry1] : 2021   in person\par \par PCP: Dr. Abdifatah Cutler at [email or web address] [phone]\par             Eyes: Dr. Tobias Pichardo 2015, 2017 p [phone]\par             Card: Dr. Alvarez for EST (retired)\par             GI: Dr. Zimmerman - 2014 - +FHx colon cancer age 61\par             Ortho: Dr. Cisneros #2 for knee surgery\par             -> Dr. Howard Red p \par             Nephro: Dr. Lino Ahn - since  for elevated creatinine - retired\par             phone: [phone] [place]  fax: [phone]  now Dr. Jarrell Frye \par                                  -> ?Izabel Junior  p at [email or web address].  \par             Feet: Dr. Carrillo in [facility] - Hx bunion surgery bilateral\par             Chiropractor: Dr. Wilber Zambrano phone [phone]\par            .\par            CC: Type 2 diabetes () 2016 A1c 7.2 %                  \par             -"trace retinopathy" - Dr. Lara in [place]  last 2019\par             Possible allergy to insulin\par             Early hypothyroidism - TSH 6.46 2016 -> 2.89 (3/2016; \par             TPO ab neg) \par             (CRI: creatinine 1.48, urine microalbumin - negative)\par             2017 fall: R rotator cuff and R wrist injury\par            .\par # Diabetes:  went to [facility] on 2021 and urine microalbumin ratio normal\par     FBS 93\par      A1c 6.5 %\par      creatinine 1.52  - he has new nephrologist at [email or web address] in [facility] and he will\par                     bring labs to her.  \par        remains on [id number] BID\par          glipizide ER 2.5 in PM\par            Leonora 14 data reviewed.  Has some spikes after meals over 300 - e.g., after pizza \par \par #  TSH 5.09   not on medication\par \par Plan:  to derm for darker lesion posterio LLE for 6 months\par \par \par \par 2021     iPhone\par \par PCP: Dr. Abdifatah Cutler at [facility] f [phone]\par             Eyes: Dr. Tobias Pichardo 2015, 2017 p [phone]\par             Card: Dr. Alvarez for EST (retired)\par             GI: Dr. Zimmerman - 2014 - +FHx colon cancer age 61\par             Ortho: Dr. Cisneros #2 for knee surgery\par             -> Dr. Howard Red p \par             Nephro: Dr. Lino Ahn - since  for elevated creatinine - retired\par             phone: [phone] [place]  fax: [phone]  now Dr. Jarrell Frye \par             Feet: Dr. Carrillo in [facility] - Hx bunion surgery bilateral\par             Chiropractor: Dr. Wilber Zambrano phone [phone]\par            .\par            CC: Type 2 diabetes () 2016 A1c 7.2 %                  \par             -"trace retinopathy" - Dr. Lara in [place]  last 2019\par             Possible allergy to insulin\par             Early hypothyroidism - TSH 6.46 2016 -> 2.89 (3/2016; \par             TPO ab neg) \par             (CRI: creatinine 1.48, urine microalbumin - negative)\par             2017 fall: R rotator cuff and R wrist injury\par            .\par # A1c up to 7%\par \par #  TSH 5.09   not on medication\par \par \par \par Oct 09, 2020   Videochat  iPhone \par \par PCP: Dr. Abdifatah Cutler at [facility] [phone]\par             Eyes: Dr. Tobias Pichardo 2015, 2017 p [phone]\par             Card: Dr. Alvarez for EST (retired)\par             GI: Dr. Zimmerman - 2014 - +FHx colon cancer age 61\par             Ortho: Dr. Cisneros #2 for knee surgery\par             -> Dr. Howard Red p \par             Nephro: Dr. Lino Ahn - since  for elevated creatinine - retired\par             phone: [phone] [place]  fax: [phone]  now Dr. Jarrell Frye \par             Feet: Dr. Carrillo in [facility] -  bunion surgery bilateral\par             Chiropractor: Dr. Wilber Zambrano phone [phone]\par            .\par            CC: Type 2 diabetes () 2016 A1c 7.2 %\par             -"trace retinopathy" - Dr. Lara in [place]  last 2019\par             Possible allergy to insulin\par             Early hypothyroidism - TSH 6.46 2016 -> 2.89 (3/2016; \par             TPO ab neg) \par             (CRI: creatinine 1.48, urine microalbumin - negative)\par             2017 fall: R rotator cuff and R wrist injury\par            .\par Dr. Cutler had him tested for Corona b/o cough....still has \par \par Still has cough.\par Went to [facility] for labs.    \par 2020 included\par A1c  6.8 %\par creatinine 1.53    ***will f/u to nephrology\par \par \par Medications include:\par \par Remains on\par  glipizide ER 2.5 in PM\par pioglitazone 15 mg in PM - stopped\par JanuMet  BID\par amlodipine\par Cialis 5 mg prn\par \par Using Freestyle Leonora with benefit.\par \par \par \par May 21, 2020   [facility]  [facility] \par \par PCP: Dr. Abdifatah Cutler at [facility] [phone]\par             Eyes: Dr. Tobias Pichardo 2015, 2017 p [phone]\par             Card: Dr. Alvarez for EST (retired)\par             GI: Dr. Zimmerman - 2014 - +FHx colon cancer age 61\par             Ortho: Dr. Cisneros #2 for knee surgery\par             -> Dr. Howard Red p \par             Nephro: Dr. Lino Ahn - since  for elevated creatinine - retired\par             phone: [phone] [place]  fax: [phone]  now Dr. Jarrell Frye \par             Feet: Dr. Carrillo in [facility] -  bunion surgery bilateral\par             Chiropractor: Dr. Wilber Zambrano phone [phone]\par            .\par            CC: Type 2 diabetes () 2016 A1c 7.2 %\par             -"trace retinopathy" - Dr. Lara in [place]  last 2019\par             Possible allergy to insulin\par             Early hypothyroidism - TSH 6.46 2016 -> 2.89 (3/2016; \par             TPO ab neg) \par             (CRI: creatinine 1.48, urine microalbumin - negative)\par             2017 fall: R rotator cuff and R wrist injury\par            .\par Dr. Cutler had him tested for Corona b/o cough....still has it.\par Had recent eye examby Dr. Lara - given good report.  \par \par [facility]\par \par \par \par Dec 20, 2019\par \par PCP: Dr. Abdifatah Cutler at [facility] [phone]\par             Eyes: Dr. Tobias Pichardo 2015, 2017 p [phone]\par             Card: Dr. Alvarez for EST (retired)\par             GI: Dr. Zimmerman - 2019 - +FHx colon cancer age 61\par             Ortho: Dr. Cisneros #2 for knee surgery\par             -> Dr. Howard Red p \par             Nephro: Dr. Lino Ahn - since  for elevated creatinine - retired\par             phone: [phone] [place]  fax: [phone]  now Dr. Jarrell Frye \par             Feet: Dr. Carrillo in [facility] - Hx bunion surgery bilateral\par             Chiropractor: Dr. Wilber Zambrano phone [phone]\par            .\par            CC: Type 2 diabetes () 2016 A1c 7.2 %\par             -"trace retinopathy" - Dr. Lara in [place]  last 2019\par             Possible allergy to insulin\par             Early hypothyroidism - TSH 6.46 2016 -> 2.89 (3/2016; \par             TPO ab neg) \par             (CRI: creatinine 1.48, urine microalbumin - negative)\par             2017 fall: R rotator cuff and R wrist injury\par            .\par  A1c 7.2 %\par Recent labs from [facility] - Dr. Howard Frye \par 2019 included\par A1c 6.7 %\par TSH 3.0\par vit D 72 **\par creatinine 1.63 **\par \par Remains on glipizide ER 2.5 in PM\par pioglitazone 15 mg in PM\par JanuMet  BID\par amlodipine\par Cialis 5 mg prn\par \par Impression:   TSH has been slightly elevated in the past, now back in normal range.\par Using Leonora at $60/2\par \par Plan:  Same Rx.\par \par ROV in April or May.  \par \par \par \par \par \par 2019\par \par  PCP: Dr. Abdifatah Cutler at [facility] [phone]\par             Eyes: Dr. Tobias Pichardo 2015, 2017 p [phone]\par             Card: Dr. Alvarez for EST (retired)\par             GI: Dr. Zimmerman - 2014 - +FHx colon cancer age 61\par             Ortho: Dr. Cisneros #2 for knee surgery\par             -> Dr. Howard Red p \par             Nephro: Dr. Lino Ahn - since  for elevated creatinine - retired\par             phone: [phone] [place]  fax: [phone]  now Dr. Jarrell Frye \par             Feet: Dr. Carrillo in [facility] - Hx bunion surgery bilateral\par             Chiropractor: Dr. Wilber Zambrano phone [phone]\par            .\par            CC: Type 2 diabetes () 2016 A1c 7.2 %\par             -"trace retinopathy" - Dr. Lara in [place]  last 2019\par             Possible allergy to insulin\par             Early hypothyroidism - TSH 6.46 2016 -> 2.89 (3/2016; \par             TPO ab neg) \par             (CRI: creatinine 1.48, urine microalbumin - negative)\par             2017 fall: R rotator cuff and R wrist injury\par            .\par Last here in 2019 and switched from Leonora "10" to Leonora 14.\par Labs from January included creatinine 1.45, glucose 145 and A1c 7.1 %\par 2018 TSH 4.57 (on no Rx)\par \par Medications include:\par  glipizide ER 2.5 mg in PM           *****\par   pioglitazone 15 mg every PM\par  JanuMet  BID                *****\par   Cialis 5 mg prn\par   Amlodipine\par \par \par \par Brings in original Leonora Freedom and given coupon for Leonora 14   \par Being followed at [facility] in [place] Fax [phone] - Dr. Jarrell Frye \par Recent bogeaarfd0x 1.62 \par 25 OH vit D 37\par B12 344 \par uric acid 7.4\par Mg 1.6\par TSH 4.57 ***  (stable, abs neg)\par \par For diabetes, taking:\par \par    glipizide ER 2.5 mg in PM\par            pioglitazone 15 mg every PM\par            JanuMet  BID\par             Cialis 5 mg prn\par             Amlodipine\par \par He was given good report by his ophthalmologist. \par \par Plan:  . A1c today.  \par \par \par \par Previous notes from [facility] appended below.\par \par  2018\par            .\par            PCP: Dr. Abdifatah Cutler at [facility] [phone]\par             Eyes: Dr. Tobias Pichardo 2015, 2017 p [phone]\par             Card: Dr. Alvarez for EST\par             GI: Dr. Zimmerman - 2014 - +FHx colon cancer age 61\par             Ortho: Dr. Cisneros #2 for knee surgery\par             -> Dr. Howard Red p \par             Nephro: Dr. Lino Ahn - since  for elevated creatinine\par             phone: [phone] [place]\par             fax: [phone]\par             now Dr. Jarrell Frye \par             Feet: Dr. Carrillo in [facility] - Hx bunion surgery bilat\par             Chiropractor: Dr. Wilber Zambrano phone [phone]\par            .\par            CC: Type 2 diabetes () 2016 A1c 7.2 %\par             -"trace retinopathy" will see 2018\par             Possible allergy to insulin\par             Early hypothyroidism - TSH 6.46 2016 -> 2.89 (3/2016; \par             TPO ab neg) \par             (CRI: creatinine 1.48, urine microalb - neg)\par             2017 fall: R rotator cuff and R wrist injury\par            .\par            Reviewed Leonora.\par            Spikes after [employer] [place] [employer]. from [employer] in [place]\par            .\par            glipizide ER 2.5 mg in PM\par            Actos 15 mg every PM\par            JanuMet  BID\par             Cialis 5 mg prn\par             Amlodipine\par            .\par            Eyes checked by Dr. Collado in 2018 and again 2019 \par            .\par            ==\par            .\par            2018\par            .\par            PCP: Dr. Abdifatah Cutler at [facility] [phone]\par             Eyes: Dr. Tobias Pichardo 2015, 2017 p [phone]\par             Card: Dr. Alvarez for EST\par             GI: Dr. Zimmerman - 2014 - +FHx colon cancer age 61\par             Ortho: Dr. Cisneros #2 for knee surgery\par             -> Dr. Howard Red p \par             Nephro: Dr. Lino Ahn - since  for elevated creatinine\par             phone: [phone] [place]\par             fax: [phone]\par             now Dr. Jarrell Frye to see 2018\par             Feet: Dr. Carrillo in [facility] - Hx bunion surgery bilat\par             Chiropractor: Dr. Wilber Zambrano phone [phone]\par            .\par            CC: Type 2 diabetes () 2016 A1c 7.2 %\par             -"trace retinopathy" will see 2018\par             Possible allergy to insulin\par             Early hypothyroidism - TSH 6.46 2016 -> 2.89 (3/2016; \par             TPO ab neg) \par             (CRI: creatinine 1.48, urine microalb - neg)\par             2017 fall: R rotator cuff and R wrist injury\par            ..\par            Using [employer] [facility]\par            .\par            Meds:\par            .\par            glipizide ER 2.5 mg in PM\par            Actos 15 mg every PM\par            JanuMet  BID\par             Cialis 5 mg prn\par             Amlodipine\par            .\par            Instructed with wife regarding Leonora.\par            Discussed reactive hypoglycemia after Oatmeal. \par            ROV one month. \par            .\par            ==\par            .\par            2018\par            .\par            PCP: Dr. Abdifatah Cutler at [facility] [phone]\par             Eyes: Dr. Tobias Pichardo 2015, 2017 p [phone]\par             Card: Dr. Alvarez for EST\par             GI: Dr. Zimmerman - 2014 - +FHx colon cancer age 61\par             Ortho: Dr. Cisneros #2 for knee surgery\par             -> Dr. Howard Red p \par             Nephro: Dr. Lino Ahn - since  for elevated creatinine\par             phone: [phone] [place]\par             fax: [phone]\par             now Dr. Jarrell Frye \par             Feet: Dr. Carrillo in [facility] -  bunion surgery bilat\par             Chiropractor: Dr. Wilber Zambrano phone [phone]\par            .\par            CC: Type 2 diabetes () 2016 A1c 7.2 %\par             -"trace retinopathy"\par             Possible allergy to insulin\par             Early hypothyroidism - TSH 6.46 2016 -> 2.89 (3/2016; \par             TPO ab neg) \par             (CRI: creatinine 1.48, urine microalb - neg)\par             2017 fall: R rotator cuff and R wrist injury\par            .\par            2017 fell on black ice.\par            Today wearing sling R arm\par            Father 93 yo  past week in [facility].\par            .\par            Freestyle Leonora arrived last week, but he did not bring it in today. \par            .\par            Lab tests from last visit\par            2018 iincluded\par            A1c 6.9 %\par            TSH 2.61\par            creatinine 1.3 \par            microalbumin < 0.3 \par            .\par            Labs from\par            [facility] from\par            5/10/2018 included\par            glucosed 101 mg/dl\par            creatinine 1.55\par            A1c 7.2 % \par            .\par            Taking:\par             glipizide ER 2.5 mg in PM\par             Actos 15 mg every PM\par             JanuMet  BID\par             Cialis 5 mg prn\par             Amlodipin\par            .\par            Brings in records from \par            [facility] \par            .\par            ==\par

## 2021-06-04 NOTE — ASSESSMENT
[FreeTextEntry1] : To derm for lesion posterior LLE\par July will f/u to [facility] and he has [facility] report to bring with him.

## 2021-09-10 ENCOUNTER — APPOINTMENT (OUTPATIENT)
Dept: ENDOCRINOLOGY | Facility: CLINIC | Age: 66
End: 2021-09-10
Payer: COMMERCIAL

## 2021-09-10 PROCEDURE — 99214 OFFICE O/P EST MOD 30 MIN: CPT | Mod: 95

## 2021-09-10 NOTE — HISTORY OF PRESENT ILLNESS
[Home] : at home, [unfilled] , at the time of the visit. [Medical Office: ([facility])___] : at the medical office located in  [Verbal consent obtained from patient] : the patient, [unfilled] [FreeTextEntry1] : Sep 10, 2021   iPhone\par \par PCP: Dr. Abdifatah Cutler at [facility] [phone]\par             Eyes: Dr. Tobias Pichardo 2015, 2017 p [phone]\par             Card: Dr. Alvarez for EST (retired)\par             GI: Dr. Zimmerman - 2014 - +FHx colon cancer age 61\par             Ortho: Dr. Cisneros #2 for knee surgery\par             -> Dr. Howard Red p \par             Nephro: Dr. Lino Ahn - since  for elevated creatinine - retired\par                            now  Dr. Ximena Junior  also at [facility]\par             phone: [phone] [place]  fax: [phone]  now Dr. Jarrell Frye \par                                  -> ?Izabel Junior  p at [facility].  \par             Feet: Dr. Carrillo in . [place] - Hx bunion surgery bilateral\par             Chiropractor: Dr. Wilber Zambrano phone [phone]\par            .\par            CC: Type 2 diabetes () 2016 A1c 7.2 %                  \par             -"trace retinopathy" - Dr. Lara in [place]  last 2019\par             Possible allergy to insulin\par             Early hypothyroidism - TSH 6.46 2016 -> 2.89 (3/2016; \par             TPO ab neg) \par             (CRI: creatinine 1.48, urine microalbumin - negative)\par             2017 fall: R rotator cuff and R wrist injury\par            .\par # Diabetes:  went to [facility] on 2021 and then 2021\par     \par      A1c 6.7  (up from 6.5 in May)\par      creatinine was 1.64,  last time  creatinine 1.52  - nephrologist at [facility] is Ximena Junior  - \par                     bring labs to her.  \par        remains on [id number] BID      Plan:  once a day for now  \par          glipizide ER 2.5 in PM\par           pioglitazone 15 mg in AM  \par            Leonora 14 is still useful  \par \par #  TSH 5.32, last time 5.09   not on medication\par \par Plan:  to derm for darker lesion posterio LLE for 6 months\par \par \par \par \par \par \par \par 2021   in person\par \par PCP: Dr. Abdifatah Cutler at [facility] [phone]\par             Eyes: Dr. Tobias Pichardo 2015, 2017 p [phone]\par             Card: Dr. Alvarez for EST (retired)\par             GI: Dr. Zimmerman - 2014 - +FHx colon cancer age 61\par             Ortho: Dr. Cisneros #2 for knee surgery\par             -> Dr. Howard Red p \par             Nephro: Dr. Lino Ahn - since  for elevated creatinine - retired\par             phone: [phone] [place]  fax: [phone]  now Dr. Jarrell Frye \par                                  -> ?Izabel Junior  p at [facility].  \par             Feet: Dr. Carrillo in [facility] - Hx bunion surgery bilateral\par             Chiropractor: Dr. Wilber Zambrano phone [phone]\par            .\par            CC: Type 2 diabetes () 2016 A1c 7.2 %                  \par             -"trace retinopathy" - Dr. Lara in [place]  last 2019\par             Possible allergy to insulin\par             Early hypothyroidism - TSH 6.46 2016 -> 2.89 (3/2016; \par             TPO ab neg) \par             (CRI: creatinine 1.48, urine microalbumin - negative)\par             2017 fall: R rotator cuff and R wrist injury\par            .\par # Diabetes:  went to [facility] on 2021 and urine microalbumin ratio normal\par     FBS 93\par      A1c 6.5 %\par      creatinine 1.52  - he has new nephrologist at [facility] in [facility] and he will\par                     bring labs to her.  \par        remains on [id number] BID\par          glipizide ER 2.5 in PM\par            Leonora 14 data reviewed.  Has some spikes after meals over 300 - e.g., after pizza \par \par #  TSH 5.09   not on medication\par \par Plan:  to derm for darker lesion posterio LLE for 6 months\par \par \par \par 2021     iPhone\par \par PCP: Dr. Abdifatah Cutler at [facility] [phone]\par             Eyes: Dr. Tobias Pichardo 2015, 2017 p [phone]\par             Card: Dr. Alvarez for EST (retired)\par             GI: Dr. Zimmerman - 2014 - +FHx colon cancer age 61\par             Ortho: Dr. Cisneros #2 for knee surgery\par             -> Dr. Howard Red p \par             Nephro: Dr. Lino Ahn - since  for elevated creatinine - retired\par             phone: [phone] [place]  fax: [phone]  now Dr. Jarrell Frye \par             Feet: Dr. Carrillo in [facility] - Hx bunion surgery bilateral\par             Chiropractor: Dr. Wilber Zambrano phone [phone]\par            .\par            CC: Type 2 diabetes () 2016 A1c 7.2 %                  \par             -"trace retinopathy" - Dr. Lara in [place]  last 2019\par             Possible allergy to insulin\par             Early hypothyroidism - TSH 6.46 2016 -> 2.89 (3/2016; \par             TPO ab neg) \par             (CRI: creatinine 1.48, urine microalbumin - negative)\par             2017 fall: R rotator cuff and R wrist injury\par            .\par # A1c up to 7%\par \par #  TSH 5.09   not on medication\par \par \par \par Oct 09, 2020   Videochat  iPhone \par \par PCP: Dr. Abdifatah Cutler at [facility] f [phone]\par             Eyes: Dr. Tobias Pichardo 2015, 2017 p [phone]\par             Card: Dr. Alvarez for EST (retired)\par             GI: Dr. Zimmerman - 2014 - +FHx colon cancer age 61\par             Ortho: Dr. Cisneros #2 for knee surgery\par             -> Dr. Howard Red p \par             Nephro: Dr. Lino Ahn - since  for elevated creatinine - retired\par             phone: [phone] [place]  fax: [phone]  now Dr. Jarrell Frye \par             Feet: Dr. Carrillo in [place] - Hx bunion surgery bilateral\par             Chiropractor: Dr. Wilber Zabmrano phone [phone]\par            .\par            CC: Type 2 diabetes () 2016 A1c 7.2 %\par             -"trace retinopathy" - Dr. Lara in [place]  last 2019\par             Possible allergy to insulin\par             Early hypothyroidism - TSH 6.46 2016 -> 2.89 (3/2016; \par             TPO ab neg) \par             (CRI: creatinine 1.48, urine microalbumin - negative)\par             2017 fall: R rotator cuff and R wrist injury\par            .\par Dr. Cutler had him tested for Corona b/o cough....still has \par \par Still has cough.\par Went to [facility] for labs.    \par 2020 included\par A1c  6.8 %\par creatinine 1.53    ***will f/u to nephrology\par \par \par Medications include:\par \par Remains on\par  glipizide ER 2.5 in PM\par pioglitazone 15 mg in PM - stopped\par JanuMet  BID\par amlodipine\par Cialis 5 mg prn\par \par Using Freestyle Leonora with benefit.\par \par \par \par May 21, 2020   [facility] \par \par PCP: Dr. Abdifatah Cutler at [facility] [phone]\par             Eyes: Dr. Tobias Pichardo 2015, 2017 p [phone]\par             Card: Dr. Alvarez for EST (retired)\par             GI: Dr. Zimmerman - 2014 - +FHx colon cancer age 61\par             Ortho: Dr. Cisneros #2 for knee surgery\par             -> Dr. Howard Red p \par             Nephro: Dr. Lino Ahn - since  for elevated creatinine - retired\par             phone: [phone] [place]  fax: [phone]  now Dr. Jarrell Frye \par             Feet: Dr. Carrillo in [facility] bunion surgery bilateral\par             Chiropractor: Dr. Wilber Zambrano phone [phone]\par            .\par            CC: Type 2 diabetes () 2016 A1c 7.2 %\par             -"trace retinopathy" - Dr. Lara in [place]  last 2019\par             Possible allergy to insulin\par             Early hypothyroidism - TSH 6.46 2016 -> 2.89 (3/2016; \par             TPO ab neg) \par             (CRI: creatinine 1.48, urine microalbumin - negative)\par             2017 fall: R rotator cuff and R wrist injury\par            .\par Dr. Cutler had him tested for Corona b/o cough....still has it.\par Had recent eye examby Dr. Lara - given good report.  \par \par [facility]\par \par \par \par Dec 20, 2019\par \par PCP: Dr. Abdifatah Cutler at [facility] [phone]\par             Eyes: Dr. Tobias Pichardo 2015, 2017 p [phone]\par             Card: Dr. Alvarez for EST (retired)\par             GI: Dr. Zimmerman - 2019 - +FHx colon cancer age 61\par             Ortho: Dr. Cisneros #2 for knee surgery\par             -> Dr. Howard Red p \par             Nephro: Dr. Lino Ahn - since  for elevated creatinine - retired\par             phone: [phone] [place]  fax: [phone]  now Dr. Jarrell Frye \par             Feet: Dr. Carrillo in [facility] bunion surgery bilateral\par             Chiropractor: Dr. Wilber Zambrano phone [phone]\par            .\par            CC: Type 2 diabetes () 2016 A1c 7.2 %\par             -"trace retinopathy" - Dr. Lara in [place]  last 2019\par             Possible allergy to insulin\par             Early hypothyroidism - TSH 6.46 2016 -> 2.89 (3/2016; \par             TPO ab neg) \par             (CRI: creatinine 1.48, urine microalbumin - negative)\par             2017 fall: R rotator cuff and R wrist injury\par            .\par  A1c 7.2 %\par Recent labs from [facility] - Dr. Howard Frye \par 2019 included\par A1c 6.7 %\par TSH 3.0\par vit D 72 **\par creatinine 1.63 **\par \par Remains on glipizide ER 2.5 in PM\par pioglitazone 15 mg in PM\par JanuMet  BID\par amlodipine\par Cialis 5 mg prn\par \par Impression:   TSH has been slightly elevated in the past, now back in normal range.\par Using Leonora at $60/2\par \par Plan:  Same Rx.\par \par ROV in April or May.  \par \par \par \par \par \par 2019\par \par  PCP: Dr. Abdifatah Cutler at [facility] [phone]\par             Eyes: Dr. Tobias Pichardo 2015, 2017 p [phone]\par             Card: Dr. Alvarez for EST (retired)\par             GI: Dr. Zimmerman - 2014 - +FHx colon cancer age 61\par             Ortho: Dr. Cisneros #2 for knee surgery\par             -> Dr. Howard Red p \par             Nephro: Dr. Lino Ahn - since  for elevated creatinine - retired\par             phone: [phone] [place]  fax: [phone]  now Dr. Jarrell Frye \par             Feet: Dr. Carrillo in [facility] - Hx bunion surgery bilateral\par             Chiropractor: Dr. Wilber Zambrano phone [phone]\par            .\par            CC: Type 2 diabetes () 2016 A1c 7.2 %\par             -"trace retinopathy" - Dr. Lara in [place]  last 2019\par             Possible allergy to insulin\par             Early hypothyroidism - TSH 6.46 2016 -> 2.89 (3/2016; \par             TPO ab neg) \par             (CRI: creatinine 1.48, urine microalbumin - negative)\par             2017 fall: R rotator cuff and R wrist injury\par            .\par Last here in 2019 and switched from Leonora "10" to Leonora 14.\par Labs from January included creatinine 1.45, glucose 145 and A1c 7.1 %\par 2018 TSH 4.57 (on no Rx)\par \par Medications include:\par  glipizide ER 2.5 mg in PM           *****\par   pioglitazone 15 mg every PM\par  JanuMet  BID                *****\par   Cialis 5 mg prn\par   Amlodipine\par \par \par \par Brings in original Leonora Machias and given coupon for Leonora 14   \par Being followed at [facility] in [place] Fax [phone] - Dr. Jarrell Frye \par Recent [id number] 1.62 \par 25 OH vit D 37\par B12 344 \par uric acid 7.4\par Mg 1.6\par TSH 4.57 ***  (stable, abs neg)\par \par For diabetes, taking:\par \par    glipizide ER 2.5 mg in PM\par            pioglitazone 15 mg every PM\par            JanuMet  BID\par             Cialis 5 mg prn\par             Amlodipine\par \par He was given good report by his ophthalmologist. \par \par Plan:  . A1c today.  \par \par \par \par Previous notes from [facility] appended below.\par \par  2018\par            .\par            PCP: Dr. Abdifatah Cutler at [facility] [phone]\par             Eyes: Dr. Tobias Pichardo 2015, 2017 p [phone]\par             Card: Dr. Alvarez for EST\par             GI: Dr. Zimmerman - 2014 - +FHx colon cancer age 61\par             Ortho: Dr. Cisneros #2 for knee surgery\par             -> Dr. Howard Red p \par             Nephro: Dr. Lino Ahn - since  for elevated creatinine\par             phone: [phone] [place]\par             fax: [phone]\par             now Dr. Jarrell Frye \par             Feet: Dr. Carrillo in [facility] - Hx bunion surgery bilat\par             Chiropractor: Dr. Wilber Zambrano phone [phone]\par            .\par            CC: Type 2 diabetes () 2016 A1c 7.2 %\par             -"trace retinopathy" will see 2018\par             Possible allergy to insulin\par             Early hypothyroidism - TSH 6.46 2016 -> 2.89 (3/2016; \par             TPO ab neg) \par             (CRI: creatinine 1.48, urine microalb - neg)\par             2017 fall: R rotator cuff and R wrist injury\par            .\par            Reviewed Leonora.\par            Spikes [facility] [place] [employer] [place]\par            .\par            glipizide ER 2.5 mg in PM\par            Actos 15 mg every PM\par            JanuMet  BID\par             Cialis 5 mg prn\par             Amlodipine\par            .\par            Eyes checked by Dr. Collado in 2018 and again 2019 \par            .\par            ==\par            .\par            2018\par            .\par            PCP: Dr. Abdifatah Cutler at [facility] [phone]\par             Eyes: Dr. Tobias Pichardo 2015, 2017 p [phone]\par             Card: Dr. Alvarez for EST\par             GI: Dr. Zimmerman - 2014 - +FHx colon cancer age 61\par             Ortho: Dr. Cisneros #2 for knee surgery\par             -> Dr. Howard Red p \par             Nephro: Dr. Lino Ahn - since  for elevated creatinine\par             phone: [phone] [place]\par             fax: [phone]\par             now Dr. Jarrell Frye to see 2018\par             Feet: Dr. Carrillo in [facility] - Hx bunion surgery bilat\par             Chiropractor: Dr. Wilber Zambrano phone [phone]\par            .\par            CC: Type 2 diabetes () 2016 A1c 7.2 %\par             -"trace retinopathy" will see 2018\par             Possible allergy to insulin\par             Early hypothyroidism - TSH 6.46 2016 -> 2.89 (3/2016; \par             TPO ab neg) \par             (CRI: creatinine 1.48, urine microalb - neg)\par             2017 fall: R rotator cuff and R wrist injury\par            ..\par            Using [facility]\par            .\par            Meds:\par            .\par            glipizide ER 2.5 mg in PM\par            Actos 15 mg every PM\par            JanuMet  BID\par             Cialis 5 mg prn\par             Amlodipine\par            .\par            Instructed with wife regarding Leonora.\par            Discussed reactive hypoglycemia after O[employer]. \par            ROV one month. \par            .\par            ==\par            .\par            2018\par            .\par            PCP: Dr. Abdifatah Cutler at [facility] [phone]\par             Eyes: Dr. Tobias Pichardo 2015, 2017 p [phone]\par             Card: Dr. Alvarez for EST\par             GI: Dr. Zimmerman - 2014 - +FHx colon cancer age 61\par             Ortho: Dr. Cisneros #2 for knee surgery\par             -> Dr. Howard Red p \par             Nephro: Dr. Lino Ahn - since  for elevated creatinine\par             phone: [phone] [place]\par             fax: [phone]\par             now Dr. Jarrell Frye \par             Feet: Dr. Carrillo in [facility] -  bunion surgery bilat\par             Chiropractor: Dr. Wilber Zambrano phone [phone]\par            .\par            CC: Type 2 diabetes () 2016 A1c 7.2 %\par             -"trace retinopathy"\par             Possible allergy to insulin\par             Early hypothyroidism - TSH 6.46 2016 -> 2.89 (3/2016; \par             TPO ab neg) \par             (CRI: creatinine 1.48, urine microalb - neg)\par             2017 fall: R rotator cuff and R wrist injury\par            .\par            2017 fell on black ice.\par            Today wearing sling R arm\par            Father 93 yo  past week in [facility].\par            .\par            Freestyle Leonora arrived last week, but he did not bring it in today. \par            .\par            Lab tests from last visit\par            2018 iincluded\par            A1c 6.9 %\par            TSH 2.61\par            creatinine 1.3 \par            microalbumin < 0.3 \par            .\par            Labs from\par            [facility] from\par            5/10/2018 included\par            glucosed 101 mg/dl\par            creatinine 1.55\par            A1c 7.2 % \par            .\par            Taking:\par             glipizide ER 2.5 mg in PM\par             Actos 15 mg every PM\par             JanuMet  BID\par             Cialis 5 mg prn\par             Amlodipin\par            .\par            Brings in records from \par            [facility] \par            .\par            ==\par

## 2022-02-04 ENCOUNTER — APPOINTMENT (OUTPATIENT)
Dept: ENDOCRINOLOGY | Facility: CLINIC | Age: 67
End: 2022-02-04
Payer: COMMERCIAL

## 2022-02-04 PROCEDURE — 99214 OFFICE O/P EST MOD 30 MIN: CPT | Mod: 95

## 2022-02-06 NOTE — HISTORY OF PRESENT ILLNESS
[Home] : at home, [unfilled] , at the time of the visit. [Medical Office: ([facility])___] : at the medical office located in  [Verbal consent obtained from patient] : the patient, [unfilled] [FreeTextEntry1] : 2022    iPhone\par \par PCP: Dr. Abdifatah Cutler at [facility] [phone]\par             Eyes: Dr. Tobias Pichardo 2015, 2017 p [phone]    Saw 2022 - told of early cataract.  \par             Card: Dr. Alvarez for EST (retired) - > does not currently see Cardiologist   \par             GI: Dr. Zimmerman - 2014 - +FHx colon cancer age 61\par             Ortho: Dr. Cisneros #2 for knee surgery  -> Dr. Howard Red p \par             Nephro: Dr. Lino Ahn - since  for elevated creatinine - retired\par                            now  Dr. Ximena Junior  also at [facility]\par             phone: [phone] [place]  fax: [phone]  now Dr. Jarrell Frye \par                                  -> ?Izabel Junior  p at [facility].  \par             Feet: Dr. Carrillo in [facility] - Hx bunion surgery bilateral\par             Chiropractor: Dr. Wilber Zambrano phone [phone]\par            .\par            CC: Type 2 diabetes () 2016 A1c 7.2 %                  \par             -"trace retinopathy" - Dr. Lara in [place]  last 2019\par             Possible allergy to insulin\par             Early hypothyroidism - TSH 6.46 2016 -> 2.89 (3/2016; \par             TPO ab neg) \par             (CRI: creatinine 1.48, urine microalbumin - negative)\par             2017 fall: R rotator cuff and R wrist injury\par            .\par # Diabetes:  went to [facility] on 2021 and then 2021\par     \par      A1c 6.7  (up from 6.5 in May)   recent [facility] 7.1     \par      creatinine was 1.64,  last time  creatinine 1.52  - nephrologist at [facility] is Ximena Junior  - \par                     bring labs to her.    Recent [facility]  1.63\par        remains on [id number] BID      Plan:  once a day for now - reminded him again today \par          glipizide ER 2.5 in PM\par           pioglitazone 15 mg in AM  \par            Leonora 14 is still useful  \par \par #  TSH 5.32, last time 5.09   not on medication      Recent [facility]  TSH 4.36\par \par Saw derm for darker lesion posterior LLE - and had Bx - awaits results \par \par Today he reports he has been noting some episodes of light headedness when he stands up (chose TEB for today).\par B12 was 299  \par Regarding the light headedness, it sounds like an orthostatic symptom and I advised him to request a referral to Cardiology from his PCP, Dr. Cutler.\par ROV here by May.  \par \par \par \par \par \par \par \par \par Sep 10, 2021   iPhone\par \par PCP: Dr. Abdifatah Cutler at [facility] [phone]\par             Eyes: Dr. Tobias Pichardo 2015, 2017 p [phone]\par             Card: Dr. Alvarez for EST (retired)\par             GI: Dr. Zimmerman - 2014 - +FHx colon cancer age 61\par             Ortho: Dr. Cisneros #2 for knee surgery\par             -> Dr. Howard Red p \par             Nephro: Dr. Lino Ahn - since  for elevated creatinine - retired\par                            now  Dr. Ximena Junior  also at [facility]\par             phone: [phone] [place]  fax: [phone]  now Dr. Jarrell Frye \par                                  -> ?Izabel Junior  p at [facility].  \par             Feet: Dr. Carrillo in [facility] - Hx bunion surgery bilateral\par             Chiropractor: Dr. Wilber Zambrano phone [phone]\par            .\par            CC: Type 2 diabetes () 2016 A1c 7.2 %                  \par             -"trace retinopathy" - Dr. Lara in [place]  last 2019\par             Possible allergy to insulin\par             Early hypothyroidism - TSH 6.46 2016 -> 2.89 (3/2016; \par             TPO ab neg) \par             (CRI: creatinine 1.48, urine microalbumin - negative)\par             2017 fall: R rotator cuff and R wrist injury\par            .\par # Diabetes:  went to [facility] on 2021 and then 2021\par     \par      A1c 6.7  (up from 6.5 in May)\par      creatinine was 1.64,  last time  creatinine 1.52  - nephrologist at [facility] is Ximena Junior  - \par                     bring labs to her.  \par        remains on [id number] BID      Plan:  once a day for now  \par          glipizide ER 2.5 in PM\par           pioglitazone 15 mg in AM  \par            Leonora 14 is still useful  \par \par #  TSH 5.32, last time 5.09   not on medication\par \par Plan:  to derm for darker lesion posterio LLE for 6 months\par \par \par \par \par \par \par \par 2021   in person\par \par PCP: Dr. Abdifatah Cutler at [facility] f [phone]\par             Eyes: Dr. Tobias Pichardo 2015, 2017 p [phone]\par             Card: Dr. Alvarez for EST (retired)\par             GI: Dr. Zimmerman - 2014 - +FHx colon cancer age 61\par             Ortho: Dr. Cisneros #2 for knee surgery\par             -> Dr. Howard Red p \par             Nephro: Dr. Lino Ahn - since  for elevated creatinine - retired\par             phone: [phone] [place]  fax: [phone]  now Dr. Jarrell Frye \par                                  -> ?Izabel Junior  p at [facility].  \par             Feet: Dr. Carrillo in . [place] - Hx bunion surgery bilateral\par             Chiropractor: Dr. Wilber Zambrano phone [phone]\par            .\par            CC: Type 2 diabetes () 2016 A1c 7.2 %                  \par             -"trace retinopathy" - Dr. Lara in [place]  last 2019\par             Possible allergy to insulin\par             Early hypothyroidism - TSH 6.46 2016 -> 2.89 (3/2016; \par             TPO ab neg) \par             (CRI: creatinine 1.48, urine microalbumin - negative)\par             2017 fall: R rotator cuff and R wrist injury\par            .\par # Diabetes:  went to [facility] on 2021 and urine microalbumin ratio normal\par     FBS 93\par      A1c 6.5 %\par      creatinine 1.52  - he has new nephrologist at [facility] in [facility] and he will\par                     bring labs to her.  \par        remains on [id number] BID\par          glipizide ER 2.5 in PM\par            Leonora 14 data reviewed.  Has some spikes after meals over 300 - e.g., after pizza \par \par #  TSH 5.09   not on medication\par \par Plan:  to derm for darker lesion posterio LLE for 6 months\par \par \par \par 2021     iPhone\par \par PCP: Dr. Abdifatah Cutler at [facility] [phone]\par             Eyes: Dr. Tobias Pichardo 2015, 2017 p [phone]\par             Card: Dr. Alvarez for EST (retired)\par             GI: Dr. Zimmerman - 2014 - +FHx colon cancer age 61\par             Ortho: Dr. Cisneros #2 for knee surgery\par             -> Dr. Howard Red p \par             Nephro: Dr. Lino Ahn - since  for elevated creatinine - retired\par             phone: [phone] [place]  fax: [phone]  now Dr. Jarrell Frye \par             Feet: Dr. Carrillo in [facility] - Hx bunion surgery bilateral\par             Chiropractor: Dr. Wilber Zambrano phone [phone]\par            .\par            CC: Type 2 diabetes () 2016 A1c 7.2 %                  \par             -"trace retinopathy" - Dr. Lara in [place]  last 2019\par             Possible allergy to insulin\par             Early hypothyroidism - TSH 6.46 2016 -> 2.89 (3/2016; \par             TPO ab neg) \par             (CRI: creatinine 1.48, urine microalbumin - negative)\par             2017 fall: R rotator cuff and R wrist injury\par            .\par # A1c up to 7%\par \par #  TSH 5.09   not on medication\par \par \par \par Oct 09, 2020   Videochat  iPhone \par \par PCP: Dr. Abdifatah Cutler [facility] [phone]\par             Eyes: Dr. Tobias Pichardo 2015, 2017 p [phone]\par             Card: Dr. Alvarez for EST (retired)\par             GI: Dr. Zimmerman - 2014 - +FHx colon cancer age 61\par             Ortho: Dr. Cisneros #2 for knee surgery\par             -> Dr. Howard Red p \par             Nephro: Dr. Lino Ahn - since  for elevated creatinine - retired\par             phone: [phone] [place]  fax: [phone]  now Dr. Jarrell Frye \par             Feet: Dr. Carrillo in  [place] - Hx bunion surgery bilateral\par             Chiropractor: Dr. Wilber Zambrano phone [phone]\par            .\par            CC: Type 2 diabetes () 2016 A1c 7.2 %\par             -"trace retinopathy" - Dr. Lara in [place]  last 2019\par             Possible allergy to insulin\par             Early hypothyroidism - TSH 6.46 2016 -> 2.89 (3/2016; \par             TPO ab neg) \par             (CRI: creatinine 1.48, urine microalbumin - negative)\par             2017 fall: R rotator cuff and R wrist injury\par            .\par Dr. Cutler had him tested for Corona b/o cough....still has \par \par Still has cough.\par Went to [facility] for labs.    \par 2020 included\par A1c  6.8 %\par creatinine 1.53    ***will f/u to nephrology\par \par \par Medications include:\par \par Remains on\par  glipizide ER 2.5 in PM\par pioglitazone 15 mg in PM - stopped\par JanuMet  BID\par amlodipine\par Cialis 5 mg prn\par \par Using Freestyle Leonora with benefit.\par \par \par \par May 21, 2020   VideoChat  Facetime \par \par PCP: Dr. Abdifatah Cutler at [facility] [phone]\par             Eyes: Dr. Tobias Pichardo 2015, 2017 p [phone]\par             Card: Dr. Alvarez for EST (retired)\par             GI: Dr. Zimmerman - 2014 - +FHx colon cancer age 61\par             Ortho: Dr. Cisneros #2 for knee surgery\par             -> Dr. Howard Red p \par             Nephro: Dr. Lino Ahn - since  for elevated creatinine - retired\par             phone: [phone] [employer]  fax: [phone]  now Dr. Jarrell Frye \par             Feet: Dr. Carrillo in [facility] - Hx bunion surgery bilateral\par             Chiropractor: Dr. Wilber Zambrano phone [phone]\par            .\par            CC: Type 2 diabetes () 2016 A1c 7.2 %\par             -"trace retinopathy" - Dr. Lara in [place]  last 2019\par             Possible allergy to insulin\par             Early hypothyroidism - TSH 6.46 2016 -> 2.89 (3/2016; \par             TPO ab neg) \par             (CRI: creatinine 1.48, urine microalbumin - negative)\par             2017 fall: R rotator cuff and R wrist injury\par            .\par Dr. Cutler had him tested for Corona b/o cough....still has it.\par Had recent eye examby Dr. Lara - given good report.  \par \par [facility]\par \par \par \par Dec 20, 2019\par \par PCP: Dr. Abdifatah Cutler at [facility] [phone]\par             Eyes: Dr. Tobias Pichardo 2015, 2017 p [phone]\par             Card: Dr. Alvarez for EST (retired)\par             GI: Dr. Zimmerman - 2019 - +FHx colon cancer age 61\par             Ortho: Dr. Cisneros #2 for knee surgery\par             -> Dr. Howard Red p \par             Nephro: Dr. Lino Ahn - since  for elevated creatinine - retired\par             phone: [phone] [employer]  fax: [phone]  now Dr. Jarrell Frye \par             Feet: Dr. Carrillo in [place] - Hx bunion surgery bilateral\par             Chiropractor: Dr. Wilber Zambrano phone [phone]\par            .\par            CC: Type 2 diabetes () 2016 A1c 7.2 %\par             -"trace retinopathy" - Dr. Lara in [place]  last 2019\par             Possible allergy to insulin\par             Early hypothyroidism - TSH 6.46 2016 -> 2.89 (3/2016; \par             TPO ab neg) \par             (CRI: creatinine 1.48, urine microalbumin - negative)\par             2017 fall: R rotator cuff and R wrist injury\par            .\par  A1c 7.2 %\par Recent labs from [facility] - Dr. Howard Frye \par 2019 included\par A1c 6.7 %\par TSH 3.0\par vit D 72 **\par creatinine 1.63 **\par \par Remains on glipizide ER 2.5 in PM\par pioglitazone 15 mg in PM\par JanuMet  BID\par amlodipine\par Cialis 5 mg prn\par \par Impression:   TSH has been slightly elevated in the past, now back in normal range.\par Using Leonora at $60/2\par \par Plan:  Same Rx.\par \par ROV in April or May.  \par \par \par \par \par \par 2019\par \par  PCP: Dr. Abdifatah Cutler at [facility] [phone]\par             Eyes: Dr. Tobias Pichardo 2015, 2017 p [phone]\par             Card: Dr. Alvarez for EST (retired)\par             GI: Dr. Zimmerman - 2014 - +FHx colon cancer age 61\par             Ortho: Dr. Cisneros #2 for knee surgery\par             -> Dr. Howard Red p \par             Nephro: Dr. Lino Ahn - since  for elevated creatinine - retired\par             phone: [phone] [place]  fax: [phone]  now Dr. Jarrell Frye \par             Feet: Dr. Carrillo in [facility] - Hx bunion surgery bilateral\par             Chiropractor: Dr. Wilber Zambrano phone [phone]\par            .\par            CC: Type 2 diabetes () 2016 A1c 7.2 %\par             -"trace retinopathy" - Dr. Lara in [place]  last 2019\par             Possible allergy to insulin\par             Early hypothyroidism - TSH 6.46 2016 -> 2.89 (3/2016; \par             TPO ab neg) \par             (CRI: creatinine 1.48, urine microalbumin - negative)\par             2017 fall: R rotator cuff and R wrist injury\par            .\par Last here in 2019 and switched from Leonora "10" to Leonora 14.\par Labs from January included creatinine 1.45, glucose 145 and A1c 7.1 %\par 2018 TSH 4.57 (on no Rx)\par \par Medications include:\par  glipizide ER 2.5 mg in PM           *****\par   pioglitazone 15 mg every PM\par  JanuMet  BID                *****\par   Cialis 5 mg prn\par   Amlodipine\par \par \par \par Brings in original Leonora [place] and given coupon for Leonora 14   \par Being followed at [facility] in [place] Fax [phone] - Dr. Jarrell Frye \par Recent [id number] 1.62 \par 25 OH vit D 37\par B12 344 \par uric acid 7.4\par Mg 1.6\par TSH 4.57 ***  (stable, abs neg)\par \par For diabetes, taking:\par \par    glipizide ER 2.5 mg in PM\par            pioglitazone 15 mg every PM\par            JanuMet  BID\par             Cialis 5 mg prn\par             Amlodipine\par \par He was given good report by his ophthalmologist. \par \par Plan:  . A1c today.  \par \par \par \par Previous notes from [employer] appended below.\par \par  2018\par            .\par            PCP: Dr. Abdiftaah Cutler at [facility] [phone]\par             Eyes: Dr. Tobias Pichardo 2015, 2017 p [phone]\par             Card: Dr. Alvarez for EST\par             GI: Dr. Zimmerman - 2014 - +FHx colon cancer age 61\par             Ortho: Dr. Cisneros #2 for knee surgery\par             -> Dr. Howard Red p \par             Nephro: Dr. Lino Ahn - since  for elevated creatinine\par             phone: [phone] [place]\par             fax: [phone]\par             now Dr. Jarrell Frye \par             Feet: Dr. Carrillo in [facility] -  bunion surgery bilat\par             Chiropractor: Dr. Wilber Zambrano phone [phone]\par            .\par            CC: Type 2 diabetes () 2016 A1c 7.2 %\par             -"trace retinopathy" will see 2018\par             Possible allergy to insulin\par             Early hypothyroidism - TSH 6.46 2016 -> 2.89 (3/2016; \par             TPO ab neg) \par             (CRI: creatinine 1.48, urine microalb - neg)\par             2017 fall: R rotator cuff and R wrist injury\par            .\par            Reviewed Leonora.\par            Spikes after [employer] [employer]. from [employer] in [place]\par            .\par            glipizide ER 2.5 mg in PM\par            Actos 15 mg every PM\par            JanuMet  BID\par             Cialis 5 mg prn\par             Amlodipine\par            .\par            Eyes checked by Dr. Collado in 2018 and again 2019 \par            .\par            ==\par            .\par            2018\par            .\par            PCP: Dr. Abdifatah Cutler at [facility] [phone]\par             Eyes: Dr. Tobias Pichardo 2015, 2017 p [phone]\par             Card: Dr. Alvarez for EST\par             GI: Dr. Zimmerman - 2014 - +FHx colon cancer age 61\par             Ortho: Dr. Cisneros #2 for knee surgery\par             -> Dr. Howard Red p \par             Nephro: Dr. Lino Ahn - since  for elevated creatinine\par             phone: [phone] [place]\par             fax: [phone]\par             now Dr. Jarrell Frye to see 2018\par             Feet: Dr. Carrillo in [facility] bunion surgery bilat\par             Chiropractor: Dr. Wilber Zambrano phone [phone]\par            .\par            CC: Type 2 diabetes () 2016 A1c 7.2 %\par             -"trace retinopathy" will see 2018\par             Possible allergy to insulin\par             Early hypothyroidism - TSH 6.46 2016 -> 2.89 (3/2016; \par             TPO ab neg) \par             (CRI: creatinine 1.48, urine microalb - neg)\par             2017 fall: R rotator cuff and R wrist injury\par            ..\par            Using [employer]\par            .\par            Meds:\par            .\par            glipizide ER 2.5 mg in PM\par            Actos 15 mg every PM\par            JanuMet  BID\par             Cialis 5 mg prn\par             Amlodipine\par            .\par            Instructed with wife regarding Leonora.\par            Discussed reactive hypoglycemia after Oatmeal. \par            ROV one month. \par            .\par            ==\par            .\par            2018\par            .\par            PCP: Dr. Abdifatah Cutler at [facility] [phone]\par             Eyes: Dr. Tobias Pichardo 2015, 2017 p [phone]\par             Card: Dr. Alvarez for EST\par             GI: Dr. Zimmerman - 2014 - +FHx colon cancer age 61\par             Ortho: Dr. Cisneros #2 for knee surgery\par             -> Dr. Howard Red p \par             Nephro: Dr. Lino Ahn - since  for elevated creatinine\par             phone: [phone] [place]\par             fax: [phone]\par             now Dr. Jarrell Frye \par             Feet: Dr. Carrillo in [facility] bunion surgery bilat\par             Chiropractor: Dr. Wilber Zambrano phone [phone]\par            .\par            CC: Type 2 diabetes () 2016 A1c 7.2 %\par             -"trace retinopathy"\par             Possible allergy to insulin\par             Early hypothyroidism - TSH 6.46 2016 -> 2.89 (3/2016; \par             TPO ab neg) \par             (CRI: creatinine 1.48, urine microalb - neg)\par             2017 fall: R rotator cuff and R wrist injury\par            .\par            2017 fell on black ice.\par            Today wearing sling R arm\par            Father 95 yo  past week in [facility].\par            .\par            Freestyle Leonora arrived last week, but he did not bring it in today. \par            .\par            Lab tests from last visit\par            2018 iincluded\par            A1c 6.9 %\par            TSH 2.61\par            creatinine 1.3 \par            microalbumin < 0.3 \par            .\par            Labs from\par            [facility] from\par            5/10/2018 included\par            glucosed 101 mg/dl\par            creatinine 1.55\par            A1c 7.2 % \par            .\par            Taking:\par             glipizide ER 2.5 mg in PM\par             Actos 15 mg every PM\par             JanuMet  BID\par             Cialis 5 mg prn\par             Amlodipin\par            .\par            Brings in records from \par            [facility] [facility] \par            .\par            ==\par

## 2022-08-18 ENCOUNTER — APPOINTMENT (OUTPATIENT)
Dept: ENDOCRINOLOGY | Facility: CLINIC | Age: 67
End: 2022-08-18

## 2022-08-18 VITALS
OXYGEN SATURATION: 98 % | SYSTOLIC BLOOD PRESSURE: 120 MMHG | WEIGHT: 227 LBS | DIASTOLIC BLOOD PRESSURE: 70 MMHG | HEART RATE: 94 BPM | HEIGHT: 70 IN | BODY MASS INDEX: 32.5 KG/M2 | TEMPERATURE: 98 F

## 2022-08-18 DIAGNOSIS — E11.9 TYPE 2 DIABETES MELLITUS W/OUT COMPLICATIONS: ICD-10-CM

## 2022-08-18 PROCEDURE — 99214 OFFICE O/P EST MOD 30 MIN: CPT

## 2022-08-18 RX ORDER — FLASH GLUCOSE SENSOR
KIT MISCELLANEOUS
Qty: 2 | Refills: 11 | Status: ACTIVE | COMMUNITY
Start: 2020-01-08 | End: 1900-01-01

## 2022-08-18 RX ORDER — GLIPIZIDE 2.5 MG/1
2.5 TABLET, FILM COATED, EXTENDED RELEASE ORAL
Qty: 90 | Refills: 3 | Status: ACTIVE | COMMUNITY
Start: 2019-08-13 | End: 1900-01-01

## 2022-08-18 RX ORDER — PIOGLITAZONE HYDROCHLORIDE 15 MG/1
15 TABLET ORAL
Qty: 90 | Refills: 1 | Status: ACTIVE | COMMUNITY
Start: 2021-09-10 | End: 1900-01-01

## 2022-08-18 RX ORDER — SITAGLIPTIN AND METFORMIN HYDROCHLORIDE 50; 1000 MG/1; MG/1
50-1000 TABLET, FILM COATED ORAL
Qty: 180 | Refills: 3 | Status: ACTIVE | COMMUNITY
Start: 2019-08-13 | End: 1900-01-01

## 2022-08-18 NOTE — HISTORY OF PRESENT ILLNESS
[FreeTextEntry1] : Aug 18, 2022     in person\par \par PCP: Dr. Abdifatah Cutler at [facility] f [phone]\par             Eyes: Dr. Tobias Pichardo 2015, 2017 p [phone]    Saw 2022 - told of early cataract.  \par             Card: Dr. Alvarez for EST (retired) - > at [facility], Dr. Leon (?) 2022 b/o syncope x 3 & stopped diuretic w/ benefit\par                                inj R shoulder  \par             GI: Dr. Zimmerman - 2014 - +FHx colon cancer age 61\par             Ortho: Dr. Cisneros #2 for knee surgery  -> Dr. Howard Red p \par             Nephro: Dr. Lino Ahn - since  for elevated creatinine - retired\par                            now  Dr. Ximena Junior  also at [facility]\par             phone: [phone] [place]  fax: [phone]  now Dr. Jarrell Frye \par                                  -> ?Izabel Junior  p at [facility].  \par             Feet: Dr. Carrillo in [place] - Hx bunion surgery bilateral\par             Chiropractor: Dr. Wilber Zambrano phone [phone]\par            .\par            CC: Type 2 diabetes () 2016 A1c 7.2 %                  \par             -"trace retinopathy" - Dr. Lara in [place]  last 2019\par             Possible allergy to insulin\par             Early hypothyroidism - TSH 6.46 2016 -> 2.89 (3/2016; \par             TPO ab neg) \par             (CRI: creatinine 1.48, urine microalbumin - negative)\par             2017 fall: R rotator cuff and R wrist injury\par \par 67 yo had 3 episodes of syncope when he stood up, injured R shoulder.  Advised by his [facility] cardiologist to discontinue the\par diuretic and has had no syncope since then.      Had been on diuretic for about 7 years.\par Because of decreased GFR, I suggested he lower the Janumet to once a day instead of BID, but he reports the sugars went up so he restarted taking it BID and sugars improved.\par \par Still using the Leonora 14 with benefit\par            .\par # Diabetes:  went to [facility] on 2021 and then 2021\par     \par      A1c 6.7  (up from 6.5 in May)   last visit  [facility] A1c7.1 and this time 7.2      \par      creatinine was 1.64,  last time  creatinine 1.52  - nephrologist at [facility] is Ximena Junior  - \par                     bring labs to her.    Last t [facility]  1.63 and on 2022 creat 1.67    \par        as noted above,  remains on [id number] BID   \par          glipizide ER 2.5 in PM\par           pioglitazone 15 mg in AM  \par            Leonora 14 is still useful  \par \par #  TSH 5.32, last time 5.09   not on medication      Recent [facility]  TSH 4.36\par \par : :\par Constitutional:  Alert, well nourished, healthy appearance, no acute distress \par Eyes:  No proptosis, no stare\par Thyroid:\par Pulmonary:  No respiratory distress, no accessory muscle use; normal rate and effort\par Cardiac:  Normal rate\par Vascular: \par Endocrine:  No stigmata of Cushing’s Syndrome\par Musculoskeletal:  Normal gait, no involuntary movements\par Neurology:  No tremors\par Affect/Mood/Psych:  Oriented x 3; normal affect, normal insight/judgement, normal mood \par .\par \par Impression:  All in all, doing very well.  \par \par Plan:  Same Rx.  \par \par \par 2022    iPhone\par \par PCP: Dr. Abdifatah Cutler at [facility] f [phone]\par             Eyes: Dr. Tobias Pichardo 2015, 2017 p [phone]    Saw 2022 - told of early cataract.  \par             Card: Dr. Alvarez for EST (retired) - > does not currently see Cardiologist   \par             GI: Dr. Zimmerman - 2014 - +FHx colon cancer age 61\par             Ortho: Dr. Cisneros #2 for knee surgery  -> Dr. Howard Red p \par             Nephro: Dr. Lino Ahn - since  for elevated creatinine - retired\par                            now  Dr. Ximena Junior  also at [facility]\par             phone: [phone] [place]  fax: [phone]  now Dr. Jarrell Frye \par                                  -> ?Izabel campos at [facility].  \par             Feet: Dr. Carrillo in [facility] - Hx bunion surgery bilateral\par             Chiropractor: Dr. Wilber Zambrano phone [phone]\par            .\par            CC: Type 2 diabetes () 2016 A1c 7.2 %                  \par             -"trace retinopathy" - Dr. Lara in [place]  last 2019\par             Possible allergy to insulin\par             Early hypothyroidism - TSH 6.46 2016 -> 2.89 (3/2016; \par             TPO ab neg) \par             (CRI: creatinine 1.48, urine microalbumin - negative)\par             2017 fall: R rotator cuff and R wrist injury\par            .\par # Diabetes:  went to [facility] on 2021 and then 2021\par     \par      A1c 6.7  (up from 6.5 in May)   recent [facility] 7.1     \par      creatinine was 1.64,  last time  creatinine 1.52  - nephrologist at [facility] is Ximena Junior  - \par                     bring labs to her.    Recent [facility]  1.63\par        remains on [id number] BID      Plan:  once a day for now - reminded him again today \par          glipizide ER 2.5 in PM\par           pioglitazone 15 mg in AM  \par            Leonora 14 is still useful  \par \par #  TSH 5.32, last time 5.09   not on medication      Recent [facility]  TSH 4.36\par \par Saw derm for darker lesion posterior LLE - and had Bx - awaits results \par \par Today he reports he has been noting some episodes of light headedness when he stands up (chose TEB for today).\par B12 was 299  \par Regarding the light headedness, it sounds like an orthostatic symptom and I advised him to request a referral to Cardiology from his PCP, Dr. Cutler.\par ROV here by May.  \par \par \par \par \par \par \par \par \par Sep 10, 2021   iPhone\par \par PCP: Dr. Abdifatah Cutler at [facility] f [phone]\par             Eyes: Dr. Tobias Pichardo 2015, 2017 p [phone]\par             Card: Dr. Alvarez for EST (retired)\par             GI: Dr. Zimmerman - 2014 - +FHx colon cancer age 61\par             Ortho: Dr. Cisneros #2 for knee surgery\par             -> Dr. Howard Red p \par             Nephro: Dr. Lino Ahn - since  for elevated creatinine - retired\par                            now  Dr. Ximena Junior  also at [facility]\par             phone: [phone] [place]  fax: [phone]  now Dr. Jarrell Frye \par                                  -> ?Izabel Junior  p at [facility].  \par             Feet: Dr. Carrillo in [facility] - Hx bunion surgery bilateral\par             Chiropractor: Dr. Wilber Zambrano phone [phone]\par            .\par            CC: Type 2 diabetes () 2016 A1c 7.2 %                  \par             -"trace retinopathy" - Dr. Lara in [place]  last 2019\par             Possible allergy to insulin\par             Early hypothyroidism - TSH 6.46 2016 -> 2.89 (3/2016; \par             TPO ab neg) \par             (CRI: creatinine 1.48, urine microalbumin - negative)\par             2017 fall: R rotator cuff and R wrist injury\par            .\par # Diabetes:  went to [facility] on 2021 and then 2021\par     \par      A1c 6.7  (up from 6.5 in May)\par      creatinine was 1.64,  last time  creatinine 1.52  - nephrologist at [facility] is Ximena Junior  - \par                     bring labs to her.  \par        remains on [id number] BID      Plan:  once a day for now  \par          glipizide ER 2.5 in PM\par           pioglitazone 15 mg in AM  \par            Leonora 14 is still useful  \par \par #  TSH 5.32, last time 5.09   not on medication\par \par Plan:  to derm for darker lesion posterio LLE for 6 months\par \par \par \par \par \par \par \par 2021   in person\par \par PCP: Dr. Abdifatah Cutler at [facility] f [phone]\par             Eyes: Dr. Tobias Pichardo 2015, 2017 p [phone]\par             Card: Dr. Alvarez for EST (retired)\par             GI: Dr. Zimmerman - 2014 - +FHx colon cancer age 61\par             Ortho: Dr. Cisneros #2 for knee surgery\par             -> Dr. Howard Red p \par             Nephro: Dr. Lino Ahn - since  for elevated creatinine - retired\par             phone: [phone] [place]  fax: [phone]  now Dr. Jarrell Frye \par                                  -> ?Izabel Junior  p at [facility].  \par             Feet: Dr. Carrillo in [facility] - Hx bunion surgery bilateral\par             Chiropractor: Dr. Wilber Zambrano phone [phone]\par            .\par            CC: Type 2 diabetes () 2016 A1c 7.2 %                  \par             -"trace retinopathy" - Dr. Lara in [place]  last 2019\par             Possible allergy to insulin\par             Early hypothyroidism - TSH 6.46 2016 -> 2.89 (3/2016; \par             TPO ab neg) \par             (CRI: creatinine 1.48, urine microalbumin - negative)\par             2017 fall: R rotator cuff and R wrist injury\par            .\par # Diabetes:  went to [facility] on 2021 and urine microalbumin ratio normal\par     FBS 93\par      A1c 6.5 %\par      creatinine 1.52  - he has new nephrologist at [facility] in [facility] and he will\par                     bring labs to her.  \par        remains on [id number] BID\par          glipizide ER 2.5 in PM\par            Leonora 14 data reviewed.  Has some spikes after meals over 300 - e.g., after pizza \par \par #  TSH 5.09   not on medication\par \par Plan:  to derm for darker lesion posterio LLE for 6 months\par \par \par \par 2021     iPhone\par \par PCP: Dr. Abdifatah Cutler at [facility] f [phone]\par             Eyes: Dr. Tobias Pichardo 2015, 2017 p [phone]\par             Card: Dr. Alvarez for EST (retired)\par             GI: Dr. Zimmerman - 2014 - +FHx colon cancer age 61\par             Ortho: Dr. Cisneros #2 for knee surgery\par             -> Dr. Howard Red p \par             Nephro: Dr. Lino Ahn - since  for elevated creatinine - retired\par             phone: [phone] [facility]  fax: [phone]  now Dr. Jarrell Frye \par             Feet: Dr. Carrillo in [facility] - Hx bunion surgery bilateral\par             Chiropractor: Dr. Wilber Zambrano phone [phone]\par            .\par            CC: Type 2 diabetes () 2016 A1c 7.2 %                  \par             -"trace retinopathy" - Dr. Lara in [place]  last 2019\par             Possible allergy to insulin\par             Early hypothyroidism - TSH 6.46 2016 -> 2.89 (3/2016; \par             TPO ab neg) \par             (CRI: creatinine 1.48, urine microalbumin - negative)\par             2017 fall: R rotator cuff and R wrist injury\par            .\par # A1c up to 7%\par \par #  TSH 5.09   not on medication\par \par \par \par Oct 09, 2020   Videochat  iPhone \par \par PCP: Dr. Abdifatah Cutler at [facility] f [phone]\par             Eyes: Dr. Tobias Pichardo 2015, 2017 p [phone]\par             Card: Dr. Alvarez for EST (retired)\par             GI: Dr. Zimmerman - 2014 - +FHx colon cancer age 61\par             Ortho: Dr. Cisneros #2 for knee surgery\par             -> Dr. Howard Red p \par             Nephro: Dr. Lino Ahn - since  for elevated creatinine - retired\par             phone: [phone] [facility]  fax: [phone]  now Dr. Jarrell Frye \par             Feet: Dr. Carrillo in [facility] -  bunion surgery bilateral\par             Chiropractor: Dr. Wilber Zambrano phone [phone]\par            .\par            CC: Type 2 diabetes () 2016 A1c 7.2 %\par             -"trace retinopathy" - Dr. Lara in [place]  last 2019\par             Possible allergy to insulin\par             Early hypothyroidism - TSH 6.46 2016 -> 2.89 (3/2016; \par             TPO ab neg) \par             (CRI: creatinine 1.48, urine microalbumin - negative)\par             2017 fall: R rotator cuff and R wrist injury\par            .\par Dr. Cutler had him tested for Corona b/o cough....still has \par \par Still has cough.\par Went to [facility] for [facility].    \par 2020 included\par A1c  6.8 %\par creatinine 1.53    ***will f/u to nephrology\par \par \par Medications include:\par \par Remains on\par  glipizide ER 2.5 in PM\par pioglitazone 15 mg in PM - stopped\par JanuMet  BID\par amlodipine\par Cialis 5 mg prn\par \par Using Freestyle Leonora with benefit.\par \par \par \par May 21, 2020   VideoChat  Facetime \par \par PCP: Dr. Abdifatah Cutler at [facility] f [phone]\par             Eyes: Dr. Tobias Pichardo 2015, 2017 p [phone]\par             Card: Dr. Alvarez for EST (retired)\par             GI: Dr. Zimmerman - 2014 - +FHx colon cancer age 61\par             Ortho: Dr. Cisneros #2 for knee surgery\par             -> Dr. Howard Red p \par             Nephro: Dr. Lino Ahn - since  for elevated creatinine - retired\par             phone: [phone] [place]  fax: [phone]  now Dr. Jarrell Frye \par             Feet: Dr. Carrillo in [facility] -  bunion surgery bilateral\par             Chiropractor: Dr. Wilber Zambrano phone [phone]\par            .\par            CC: Type 2 diabetes () 2016 A1c 7.2 %\par             -"trace retinopathy" - Dr. Lara in [place]  last 2019\par             Possible allergy to insulin\par             Early hypothyroidism - TSH 6.46 2016 -> 2.89 (3/2016; \par             TPO ab neg) \par             (CRI: creatinine 1.48, urine microalbumin - negative)\par             2017 fall: R rotator cuff and R wrist injury\par            .\par Dr. Cutler had him tested for Corona b/o cough....still has it.\par Had recent eye examby Dr. Lara - given good report.  \par \par Gets Leonora from [facility]\par \par \par \par Dec 20, 2019\par \par PCP: Dr. Abdifatah Cutler at [facility] f [phone]\par             Eyes: Dr. Tobias Pichardo 2015, 2017 p [phone]\par             Card: Dr. Alvarez for EST (retired)\par             GI: Dr. Zimmerman - 2019 - +FHx colon cancer age 61\par             Ortho: Dr. Cisneros #2 for knee surgery\par             -> Dr. Howard Red p \par             Nephro: Dr. Lino Ahn - since  for elevated creatinine - retired\par             phone: [phone] [place]  fax: [phone]  now Dr. Jarrell Frye \par             Feet: Dr. Carrillo in [facility] - Hx bunion surgery bilateral\par             Chiropractor: Dr. Wilber Zambrano phone [phone]\par            .\par            CC: Type 2 diabetes () 2016 A1c 7.2 %\par             -"trace retinopathy" - Dr. Lara in [place]  last 2019\par             Possible allergy to insulin\par             Early hypothyroidism - TSH 6.46 2016 -> 2.89 (3/2016; \par             TPO ab neg) \par             (CRI: creatinine 1.48, urine microalbumin - negative)\par             2017 fall: R rotator cuff and R wrist injury\par            .\par  A1c 7.2 %\par Recent labs from [facility] - Dr. Howard Frye \par 2019 included\par A1c 6.7 %\par TSH 3.0\par vit D 72 **\par creatinine 1.63 **\par \par Remains on glipizide ER 2.5 in PM\par pioglitazone 15 mg in PM\par JanuMet  BID\par amlodipine\par Cialis 5 mg prn\par \par Impression:   TSH has been slightly elevated in the past, now back in normal range.\par Using Leonora at $60/2\par \par Plan:  Same Rx.\par \par ROV in April or May.  \par \par \par \par \par \par 2019\par \par  PCP: Dr. Abdifatah Cutler at [facility] f [phone]\par             Eyes: Dr. Tobias Pichardo 2015, 2017 p [phone]\par             Card: Dr. Alvarez for EST (retired)\par             GI: Dr. Zimmerman - 2014 - +FHx colon cancer age 61\par             Ortho: Dr. Cisneros #2 for knee surgery\par             -> Dr. Howard Red p \par             Nephro: Dr. Lino Ahn - since  for elevated creatinine - retired\par             phone: [phone] [place]  fax: [phone]  now Dr. Jarrell Frye \par             Feet: Dr. Carrillo in [facility] - Hx bunion surgery bilateral\par             Chiropractor: Dr. Wilber Zambrano phone [phone]\par            .\par            CC: Type 2 diabetes () 2016 A1c 7.2 %\par             -"trace retinopathy" - Dr. Lara in [place]  last 2019\par             Possible allergy to insulin\par             Early hypothyroidism - TSH 6.46 2016 -> 2.89 (3/2016; \par             TPO ab neg) \par             (CRI: creatinine 1.48, urine microalbumin - negative)\par             2017 fall: R rotator cuff and R wrist injury\par            .\par Last here in 2019 and switched from Leonora "10" to Leonora 14.\par Labs from January included creatinine 1.45, glucose 145 and A1c 7.1 %\par 2018 TSH 4.57 (on no Rx)\par \par Medications include:\par  glipizide ER 2.5 mg in PM           *****\par   pioglitazone 15 mg every PM\par  JanuMet  BID                *****\par   Cialis 5 mg prn\par   Amlodipine\par \par \par \par Brings in original Leonora [place] and given coupon for Leonora 14   \par Being followed at [facility] in [place] Fax [phone] - Dr. Jarrell Frye \par Recent [id number] 1.62 \par 25 OH vit D 37\par B12 344 \par uric acid 7.4\par Mg 1.6\par TSH 4.57 ***  (stable, abs neg)\par \par For diabetes, taking:\par \par    glipizide ER 2.5 mg in PM\par            pioglitazone 15 mg every PM\par            JanuMet  BID\par             Cialis 5 mg prn\par             Amlodipine\par \par He was given good report by his ophthalmologist. \par \par Plan:  . A1c today.  \par \par \par \par Previous notes from [employer] appended below.\par \par  2018\par            .\par            PCP: Dr. Abidfatah Cutler at [facility] f [phone]\par             Eyes: Dr. Tobias Pichardo 2015, 2017 p [phone]\par             Card: Dr. Alvarez for EST\par             GI: Dr. Zimmerman - 2014 - +FHx colon cancer age 61\par             Ortho: Dr. Cisneros #2 for knee surgery\par             -> Dr. Howard Red p \par             Nephro: Dr. Lino Ahn - since  for elevated creatinine\par             phone: [phone] [place]\par             fax: [phone]\par             now Dr. Jarrell Frye \par             Feet: Dr. Carrillo in [facility] - Hx bunion surgery bilat\par             Chiropractor: Dr. Wilber Zambrano phone [phone]\par            .\par            CC: Type 2 diabetes () 2016 A1c 7.2 %\par             -"trace retinopathy" will see 2018\par             Possible allergy to insulin\par             Early hypothyroidism - TSH 6.46 2016 -> 2.89 (3/2016; \par             TPO ab neg) \par             (CRI: creatinine 1.48, urine microalb - neg)\par             2017 fall: R rotator cuff and R wrist injury\par            .\par            Reviewed Leonora.\par            Spikes after [employer] [employer]. from [employer] in [place]\par            .\par            glipizide ER 2.5 mg in PM\par            Actos 15 mg every PM\par            JanuMet  BID\par             Cialis 5 mg prn\par             Amlodipine\par            .\par            Eyes checked by Dr. Collado in 2018 and again 2019 \par            .\par            ==\par            .\par            2018\par            .\par            PCP: Dr. Abdifatah Cutler at [facility] f [phone]\par             Eyes: Dr. Tobias Pichardo 2015, 2017 p [phone]\par             Card: Dr. Alvarez for EST\par             GI: Dr. Zimmerman - 2014 - +FHx colon cancer age 61\par             Ortho: Dr. Cisneros #2 for knee surgery\par             -> Dr. Howard Red p \par             Nephro: Dr. Lino Ahn - since  for elevated creatinine\par             phone: [phone] [place]\par             fax: [phone]\par             now Dr. Jarrell Frye to see 2018\par             Feet: Dr. Carrillo in [facility] -  bunion surgery bilat\par             Chiropractor: Dr. Wilber Zambrano phone [phone]\par            .\par            CC: Type 2 diabetes () 2016 A1c 7.2 %\par             -"trace retinopathy" will see 2018\par             Possible allergy to insulin\par             Early hypothyroidism - TSH 6.46 2016 -> 2.89 (3/2016; \par             TPO ab neg) \par             (CRI: creatinine 1.48, urine microalb - neg)\par             2017 fall: R rotator cuff and R wrist injury\par            ..\par            Using [facility] [facility]\par            .\par            Meds:\par            .\par            glipizide ER 2.5 mg in PM\par            Actos 15 mg every PM\par            JanuMet  BID\par             Cialis 5 mg prn\par             Amlodipine\par            .\par            Instructed with wife regarding Leonora.\par            Discussed reactive hypoglycemia after Oatmeal. \par            ROV one month. \par            .\par            ==\par            .\par            2018\par            .\par            PCP: Dr. Abdifatah Cutler at [facility] f [phone]\par             Eyes: Dr. Tobias Pichardo 2015, 2017 p [phone]\par             Card: Dr. Alvarez for EST\par             GI: Dr. Zimmerman - 2014 - +FHx colon cancer age 61\par             Ortho: Dr. Cisneros #2 for knee surgery\par             -> Dr. Howard Red p \par             Nephro: Dr. Lino Ahn - since  for elevated creatinine\par             phone: [phone] [place]\par             fax: [phone]\par             now Dr. Jarrell Frye \par             Feet: Dr. Carrillo in [facility] -  bunion surgery bilat\par             Chiropractor: Dr. Wilber Zambrano phone [phone]\par            .\par            CC: Type 2 diabetes () 2016 A1c 7.2 %\par             -"trace retinopathy"\par             Possible allergy to insulin\par             Early hypothyroidism - TSH 6.46 2016 -> 2.89 (3/2016; \par             TPO ab neg) \par             (CRI: creatinine 1.48, urine microalb - neg)\par             2017 fall: R rotator cuff and R wrist injury\par            .\par            2017 fell on black ice.\par            Today wearing sling R arm\par            Father 93 yo  past week in [facility].\par            .\par            Freestyle Leonora arrived last week, but he did not bring it in today. \par            .\par            Lab tests from last visit\par            2018 iincluded\par            A1c 6.9 %\par            TSH 2.61\par            creatinine 1.3 \par            microalbumin < 0.3 \par            .\par            Labs from\par            [facility] from\par            5/10/2018 included\par            glucosed 101 mg/dl\par            creatinine 1.55\par            A1c 7.2 % \par            .\par            Taking:\par             glipizide ER 2.5 mg in PM\par             Actos 15 mg every PM\par             JanuMet  BID\par             Cialis 5 mg prn\par             Amlodipin\par            .\par            Brings in records from \par            [facility] \par            .\par            ==\par

## 2023-01-18 ENCOUNTER — APPOINTMENT (OUTPATIENT)
Dept: ENDOCRINOLOGY | Facility: CLINIC | Age: 68
End: 2023-01-18

## 2023-01-18 ENCOUNTER — NON-APPOINTMENT (OUTPATIENT)
Age: 68
End: 2023-01-18